# Patient Record
Sex: FEMALE | Race: WHITE | Employment: OTHER | URBAN - NONMETROPOLITAN AREA
[De-identification: names, ages, dates, MRNs, and addresses within clinical notes are randomized per-mention and may not be internally consistent; named-entity substitution may affect disease eponyms.]

---

## 2021-12-21 DIAGNOSIS — U07.1 COVID-19: ICD-10-CM

## 2021-12-21 RX ORDER — SODIUM CHLORIDE 0.9 % (FLUSH) 0.9 %
5-40 SYRINGE (ML) INJECTION PRN
OUTPATIENT
Start: 2021-12-21

## 2021-12-21 RX ORDER — ACETAMINOPHEN 325 MG/1
650 TABLET ORAL
OUTPATIENT
Start: 2021-12-21

## 2021-12-21 RX ORDER — ALBUTEROL SULFATE 90 UG/1
4 AEROSOL, METERED RESPIRATORY (INHALATION) PRN
OUTPATIENT
Start: 2021-12-21

## 2021-12-21 RX ORDER — HEPARIN SODIUM (PORCINE) LOCK FLUSH IV SOLN 100 UNIT/ML 100 UNIT/ML
500 SOLUTION INTRAVENOUS PRN
OUTPATIENT
Start: 2021-12-21

## 2021-12-21 RX ORDER — EPINEPHRINE 1 MG/ML
0.3 INJECTION, SOLUTION, CONCENTRATE INTRAVENOUS PRN
OUTPATIENT
Start: 2021-12-21

## 2021-12-21 RX ORDER — ONDANSETRON 2 MG/ML
8 INJECTION INTRAMUSCULAR; INTRAVENOUS
OUTPATIENT
Start: 2021-12-21

## 2021-12-21 RX ORDER — SODIUM CHLORIDE 9 MG/ML
INJECTION, SOLUTION INTRAVENOUS CONTINUOUS
OUTPATIENT
Start: 2021-12-21

## 2021-12-21 RX ORDER — DIPHENHYDRAMINE HYDROCHLORIDE 50 MG/ML
50 INJECTION INTRAMUSCULAR; INTRAVENOUS
OUTPATIENT
Start: 2021-12-21

## 2021-12-21 RX ORDER — SODIUM CHLORIDE 9 MG/ML
25 INJECTION, SOLUTION INTRAVENOUS PRN
OUTPATIENT
Start: 2021-12-21

## 2021-12-21 NOTE — PROGRESS NOTES
REGEN-COV ordered by a non-privileged provider  - Whitney Aguilar     1. Vaccine status -  Yes  2. Date of Positive SARS-CoV-2 test - 12/20/21  3. Symptom onset - 12/19/21  4. Criteria met for REGEN-COV - age  11. O2 sat on room air - not available, not on oxygen  6. EUA and side effects have been reviewed either verbally or given to patient Yes  7.  Provider phone # if questions arise - [phone]    Dr Win Ulrich agreeable to co-signing     Sathya Terry, ReaganD, BCPS 12/21/2021 12:22 PM

## 2021-12-22 ENCOUNTER — HOSPITAL ENCOUNTER (OUTPATIENT)
Dept: GENERAL RADIOLOGY | Age: 66
Discharge: HOME | End: 2021-12-22
Payer: MEDICARE

## 2021-12-22 VITALS
DIASTOLIC BLOOD PRESSURE: 74 MMHG | SYSTOLIC BLOOD PRESSURE: 134 MMHG | RESPIRATION RATE: 18 BRPM | TEMPERATURE: 97.8 F | OXYGEN SATURATION: 96 % | HEART RATE: 92 BPM

## 2021-12-22 DIAGNOSIS — U07.1 COVID-19: Primary | ICD-10-CM

## 2021-12-22 PROCEDURE — 96365 THER/PROPH/DIAG IV INF INIT: CPT

## 2021-12-22 PROCEDURE — 6360000002 HC RX W HCPCS: Performed by: INTERNAL MEDICINE

## 2021-12-22 PROCEDURE — M0243 CASIRIVI AND IMDEVI INFUSION: HCPCS

## 2021-12-22 PROCEDURE — 2580000003 HC RX 258: Performed by: INTERNAL MEDICINE

## 2021-12-22 RX ORDER — SODIUM CHLORIDE 9 MG/ML
INJECTION, SOLUTION INTRAVENOUS CONTINUOUS
OUTPATIENT
Start: 2021-12-22

## 2021-12-22 RX ORDER — SODIUM CHLORIDE 0.9 % (FLUSH) 0.9 %
5-40 SYRINGE (ML) INJECTION PRN
OUTPATIENT
Start: 2021-12-22

## 2021-12-22 RX ORDER — ALBUTEROL SULFATE 90 UG/1
4 AEROSOL, METERED RESPIRATORY (INHALATION) PRN
OUTPATIENT
Start: 2021-12-22

## 2021-12-22 RX ORDER — ACETAMINOPHEN 325 MG/1
650 TABLET ORAL
OUTPATIENT
Start: 2021-12-22

## 2021-12-22 RX ORDER — DIPHENHYDRAMINE HYDROCHLORIDE 50 MG/ML
50 INJECTION INTRAMUSCULAR; INTRAVENOUS
OUTPATIENT
Start: 2021-12-22

## 2021-12-22 RX ORDER — ONDANSETRON 2 MG/ML
8 INJECTION INTRAMUSCULAR; INTRAVENOUS
OUTPATIENT
Start: 2021-12-22

## 2021-12-22 RX ORDER — HEPARIN SODIUM (PORCINE) LOCK FLUSH IV SOLN 100 UNIT/ML 100 UNIT/ML
500 SOLUTION INTRAVENOUS PRN
OUTPATIENT
Start: 2021-12-22

## 2021-12-22 RX ORDER — SODIUM CHLORIDE 9 MG/ML
25 INJECTION, SOLUTION INTRAVENOUS PRN
OUTPATIENT
Start: 2021-12-22

## 2021-12-22 RX ADMIN — CASIRIVIMAB AND IMDEVIMAB: 600; 600 INJECTION, SOLUTION, CONCENTRATE INTRAVENOUS at 08:40

## 2021-12-22 NOTE — PROGRESS NOTES
Met: yes   Safety:         (Environmental)   [place] to environment  [facility] ID band is correct and in place/ allergy band as needed   Assess for fall risk   Initiate fall precautions as applicable (fall band, side rails, etc.)   Call light within reach   Bed in low position/ wheels locked    Met: yes   Pain:        Assess pain level and characteristics   Administer analgesics as ordered   Assess effectiveness of pain management and report to MD as needed    Met: yes   Knowledge Deficit:   Assess baseline knowledge   Provide teaching at level of understanding   Provide teaching via preferred learning method   Evaluate teaching effectiveness    Met: yes   Hemodynamic/Respiratory Status:       (Pre and Post Procedure Monitoring)   Assess/Monitor vital signs and LOC   Assess Baseline SpO2 prior to any sedation   Obtain weight/height   Assess vital signs/ LOC until patient meets discharge criteria   Monitor procedure site and notify MD of any issues    Met: yes   Infection-Risk of Central Venous Catheter:   Monitor for infection signs and symptoms (catheter site redness, temperature elevation, etc)   Assess for infection risks   Educate regarding infection prevention   Manage central venous catheter (flushes/ dressing changes per protocol)

## 2021-12-22 NOTE — PROGRESS NOTES
Pt arrives for Regeneron infusion. Gait steady respirations easy and unlabored. Skin warm and dry. Pt reviewed EUA on Regeneron and is agreeable with infusion.

## 2021-12-22 NOTE — PROGRESS NOTES
Observation period complete. Respirations remain easy and unlabored. Skin warm and dry. Pt denies any complaints and left ambulatory per self. Pt in stable condition.

## 2022-06-24 ENCOUNTER — TELEPHONE (OUTPATIENT)
Dept: FAMILY MEDICINE CLINIC | Age: 67
End: 2022-06-24

## 2022-06-24 DIAGNOSIS — Z79.4 TYPE 2 DIABETES MELLITUS WITHOUT COMPLICATION, WITH LONG-TERM CURRENT USE OF INSULIN (HCC): Primary | ICD-10-CM

## 2022-06-24 DIAGNOSIS — E11.9 TYPE 2 DIABETES MELLITUS WITHOUT COMPLICATION, WITH LONG-TERM CURRENT USE OF INSULIN (HCC): Primary | ICD-10-CM

## 2022-06-24 NOTE — TELEPHONE ENCOUNTER
Called patient to reschedule her July 14, 2022 appointment per Dr. Sebastián Wang, patient rescheduled for July 21, 2022 but stated she usually has her blood work done prior to her appointments and does not have any orders!

## 2022-06-30 DIAGNOSIS — I10 PRIMARY HYPERTENSION: Primary | ICD-10-CM

## 2022-06-30 RX ORDER — AMLODIPINE BESYLATE 2.5 MG/1
2.5 TABLET ORAL DAILY
Qty: 90 TABLET | Refills: 3 | Status: SHIPPED | OUTPATIENT
Start: 2022-06-30 | End: 2022-10-27

## 2022-07-26 ENCOUNTER — NURSE ONLY (OUTPATIENT)
Dept: LAB | Age: 67
End: 2022-07-26

## 2022-07-26 DIAGNOSIS — E11.9 TYPE 2 DIABETES MELLITUS WITHOUT COMPLICATION, WITH LONG-TERM CURRENT USE OF INSULIN (HCC): ICD-10-CM

## 2022-07-26 DIAGNOSIS — Z79.4 TYPE 2 DIABETES MELLITUS WITHOUT COMPLICATION, WITH LONG-TERM CURRENT USE OF INSULIN (HCC): ICD-10-CM

## 2022-07-27 PROBLEM — E66.9 OBESITY: Status: ACTIVE | Noted: 2022-07-27

## 2022-07-27 PROBLEM — G47.33 OBSTRUCTIVE SLEEP APNEA SYNDROME: Status: ACTIVE | Noted: 2022-07-27

## 2022-07-27 PROBLEM — E78.5 HYPERLIPIDEMIA: Status: ACTIVE | Noted: 2022-07-27

## 2022-07-27 PROBLEM — M48.061 SPINAL STENOSIS OF LUMBAR REGION: Status: ACTIVE | Noted: 2022-07-27

## 2022-07-27 PROBLEM — F32.A DEPRESSIVE DISORDER: Status: ACTIVE | Noted: 2022-07-27

## 2022-07-27 PROBLEM — E11.9 TYPE 2 DIABETES MELLITUS (HCC): Status: ACTIVE | Noted: 2022-07-27

## 2022-07-27 PROBLEM — M54.50 LOW BACK PAIN: Status: ACTIVE | Noted: 2022-07-27

## 2022-07-27 PROBLEM — I87.2 CHRONIC VENOUS INSUFFICIENCY: Status: ACTIVE | Noted: 2022-07-27

## 2022-07-27 PROBLEM — I48.91 ATRIAL FIBRILLATION (HCC): Status: ACTIVE | Noted: 2022-07-27

## 2022-07-27 LAB
ALBUMIN SERPL-MCNC: 4.4 G/DL (ref 3.5–5.1)
ALP BLD-CCNC: 84 U/L (ref 38–126)
ALT SERPL-CCNC: 14 U/L (ref 11–66)
ANION GAP SERPL CALCULATED.3IONS-SCNC: 16 MEQ/L (ref 8–16)
AST SERPL-CCNC: 21 U/L (ref 5–40)
AVERAGE GLUCOSE: 189 MG/DL (ref 70–126)
BASOPHILS # BLD: 0.7 %
BASOPHILS ABSOLUTE: 0.1 THOU/MM3 (ref 0–0.1)
BILIRUB SERPL-MCNC: 0.4 MG/DL (ref 0.3–1.2)
BUN BLDV-MCNC: 36 MG/DL (ref 7–22)
CALCIUM SERPL-MCNC: 10.8 MG/DL (ref 8.5–10.5)
CHLORIDE BLD-SCNC: 98 MEQ/L (ref 98–111)
CHOLESTEROL, FASTING: 196 MG/DL (ref 100–199)
CO2: 23 MEQ/L (ref 23–33)
CREAT SERPL-MCNC: 1.2 MG/DL (ref 0.4–1.2)
CREATININE, URINE: 67.1 MG/DL
EOSINOPHIL # BLD: 4.1 %
EOSINOPHILS ABSOLUTE: 0.5 THOU/MM3 (ref 0–0.4)
ERYTHROCYTE [DISTWIDTH] IN BLOOD BY AUTOMATED COUNT: 14.9 % (ref 11.5–14.5)
ERYTHROCYTE [DISTWIDTH] IN BLOOD BY AUTOMATED COUNT: 52.4 FL (ref 35–45)
GFR SERPL CREATININE-BSD FRML MDRD: 45 ML/MIN/1.73M2
GLUCOSE FASTING: 166 MG/DL (ref 70–108)
HBA1C MFR BLD: 8.3 % (ref 4.4–6.4)
HCT VFR BLD CALC: 43.5 % (ref 37–47)
HDLC SERPL-MCNC: 47 MG/DL
HEMOGLOBIN: 13 GM/DL (ref 12–16)
IMMATURE GRANS (ABS): 0.19 THOU/MM3 (ref 0–0.07)
IMMATURE GRANULOCYTES: 1.7 %
LDL CHOLESTEROL CALCULATED: 99 MG/DL
LYMPHOCYTES # BLD: 23.9 %
LYMPHOCYTES ABSOLUTE: 2.6 THOU/MM3 (ref 1–4.8)
MCH RBC QN AUTO: 28.5 PG (ref 26–33)
MCHC RBC AUTO-ENTMCNC: 29.9 GM/DL (ref 32.2–35.5)
MCV RBC AUTO: 95.4 FL (ref 81–99)
MICROALBUMIN UR-MCNC: < 1.2 MG/DL
MICROALBUMIN/CREAT UR-RTO: 18 MG/G (ref 0–30)
MONOCYTES # BLD: 5.6 %
MONOCYTES ABSOLUTE: 0.6 THOU/MM3 (ref 0.4–1.3)
NUCLEATED RED BLOOD CELLS: 0 /100 WBC
PLATELET # BLD: 338 THOU/MM3 (ref 130–400)
PMV BLD AUTO: 10.1 FL (ref 9.4–12.4)
POTASSIUM SERPL-SCNC: 4.8 MEQ/L (ref 3.5–5.2)
RBC # BLD: 4.56 MILL/MM3 (ref 4.2–5.4)
SEG NEUTROPHILS: 64 %
SEGMENTED NEUTROPHILS ABSOLUTE COUNT: 7 THOU/MM3 (ref 1.8–7.7)
SODIUM BLD-SCNC: 137 MEQ/L (ref 135–145)
TOTAL PROTEIN: 7.1 G/DL (ref 6.1–8)
TRIGLYCERIDE, FASTING: 248 MG/DL (ref 0–199)
TSH SERPL DL<=0.05 MIU/L-ACNC: 1.57 UIU/ML (ref 0.4–4.2)
VITAMIN B-12: 661 PG/ML (ref 211–911)
WBC # BLD: 11 THOU/MM3 (ref 4.8–10.8)

## 2022-07-27 RX ORDER — INSULIN LISPRO 100 [IU]/ML
INJECTION, SOLUTION INTRAVENOUS; SUBCUTANEOUS
COMMUNITY
Start: 2022-05-02 | End: 2022-08-25

## 2022-07-27 RX ORDER — PEN NEEDLE, DIABETIC 32 GX 1/4"
NEEDLE, DISPOSABLE MISCELLANEOUS
COMMUNITY
Start: 2022-07-11

## 2022-07-27 RX ORDER — PERPHENAZINE 16 MG/1
TABLET, FILM COATED ORAL
COMMUNITY
Start: 2022-05-02 | End: 2022-08-25

## 2022-07-27 RX ORDER — INSULIN DETEMIR 100 [IU]/ML
INJECTION, SOLUTION SUBCUTANEOUS
COMMUNITY
Start: 2022-07-11

## 2022-07-27 RX ORDER — LIRAGLUTIDE 6 MG/ML
INJECTION SUBCUTANEOUS
COMMUNITY
Start: 2022-07-11

## 2022-07-28 ENCOUNTER — OFFICE VISIT (OUTPATIENT)
Dept: FAMILY MEDICINE CLINIC | Age: 67
End: 2022-07-28
Payer: MEDICARE

## 2022-07-28 VITALS
DIASTOLIC BLOOD PRESSURE: 76 MMHG | WEIGHT: 293 LBS | SYSTOLIC BLOOD PRESSURE: 122 MMHG | HEART RATE: 74 BPM | BODY MASS INDEX: 50.02 KG/M2 | OXYGEN SATURATION: 96 % | HEIGHT: 64 IN

## 2022-07-28 DIAGNOSIS — F32.A DEPRESSIVE DISORDER: ICD-10-CM

## 2022-07-28 DIAGNOSIS — N18.31 TYPE 2 DIABETES MELLITUS WITH STAGE 3A CHRONIC KIDNEY DISEASE, WITH LONG-TERM CURRENT USE OF INSULIN (HCC): Primary | ICD-10-CM

## 2022-07-28 DIAGNOSIS — E78.2 MIXED HYPERLIPIDEMIA: ICD-10-CM

## 2022-07-28 DIAGNOSIS — I48.0 PAROXYSMAL ATRIAL FIBRILLATION (HCC): ICD-10-CM

## 2022-07-28 DIAGNOSIS — J34.89 NASAL LESION: ICD-10-CM

## 2022-07-28 DIAGNOSIS — E11.22 TYPE 2 DIABETES MELLITUS WITH STAGE 3A CHRONIC KIDNEY DISEASE, WITH LONG-TERM CURRENT USE OF INSULIN (HCC): Primary | ICD-10-CM

## 2022-07-28 DIAGNOSIS — Z79.4 TYPE 2 DIABETES MELLITUS WITH STAGE 3A CHRONIC KIDNEY DISEASE, WITH LONG-TERM CURRENT USE OF INSULIN (HCC): Primary | ICD-10-CM

## 2022-07-28 PROCEDURE — G8400 PT W/DXA NO RESULTS DOC: HCPCS | Performed by: FAMILY MEDICINE

## 2022-07-28 PROCEDURE — 2022F DILAT RTA XM EVC RTNOPTHY: CPT | Performed by: FAMILY MEDICINE

## 2022-07-28 PROCEDURE — G8427 DOCREV CUR MEDS BY ELIG CLIN: HCPCS | Performed by: FAMILY MEDICINE

## 2022-07-28 PROCEDURE — G8417 CALC BMI ABV UP PARAM F/U: HCPCS | Performed by: FAMILY MEDICINE

## 2022-07-28 PROCEDURE — 3052F HG A1C>EQUAL 8.0%<EQUAL 9.0%: CPT | Performed by: FAMILY MEDICINE

## 2022-07-28 PROCEDURE — 1090F PRES/ABSN URINE INCON ASSESS: CPT | Performed by: FAMILY MEDICINE

## 2022-07-28 PROCEDURE — 1123F ACP DISCUSS/DSCN MKR DOCD: CPT | Performed by: FAMILY MEDICINE

## 2022-07-28 PROCEDURE — 1036F TOBACCO NON-USER: CPT | Performed by: FAMILY MEDICINE

## 2022-07-28 PROCEDURE — 99214 OFFICE O/P EST MOD 30 MIN: CPT | Performed by: FAMILY MEDICINE

## 2022-07-28 PROCEDURE — 3017F COLORECTAL CA SCREEN DOC REV: CPT | Performed by: FAMILY MEDICINE

## 2022-07-28 RX ORDER — ESCITALOPRAM OXALATE 20 MG/1
30 TABLET ORAL DAILY
Qty: 90 TABLET | Refills: 3 | Status: SHIPPED | OUTPATIENT
Start: 2022-07-28

## 2022-07-28 SDOH — ECONOMIC STABILITY: FOOD INSECURITY: WITHIN THE PAST 12 MONTHS, YOU WORRIED THAT YOUR FOOD WOULD RUN OUT BEFORE YOU GOT MONEY TO BUY MORE.: NEVER TRUE

## 2022-07-28 SDOH — ECONOMIC STABILITY: FOOD INSECURITY: WITHIN THE PAST 12 MONTHS, THE FOOD YOU BOUGHT JUST DIDN'T LAST AND YOU DIDN'T HAVE MONEY TO GET MORE.: NEVER TRUE

## 2022-07-28 ASSESSMENT — PATIENT HEALTH QUESTIONNAIRE - PHQ9
2. FEELING DOWN, DEPRESSED OR HOPELESS: 0
SUM OF ALL RESPONSES TO PHQ QUESTIONS 1-9: 0
6. FEELING BAD ABOUT YOURSELF - OR THAT YOU ARE A FAILURE OR HAVE LET YOURSELF OR YOUR FAMILY DOWN: 0
8. MOVING OR SPEAKING SO SLOWLY THAT OTHER PEOPLE COULD HAVE NOTICED. OR THE OPPOSITE, BEING SO FIGETY OR RESTLESS THAT YOU HAVE BEEN MOVING AROUND A LOT MORE THAN USUAL: 0
5. POOR APPETITE OR OVEREATING: 0
4. FEELING TIRED OR HAVING LITTLE ENERGY: 0
3. TROUBLE FALLING OR STAYING ASLEEP: 0
SUM OF ALL RESPONSES TO PHQ QUESTIONS 1-9: 0
9. THOUGHTS THAT YOU WOULD BE BETTER OFF DEAD, OR OF HURTING YOURSELF: 0
7. TROUBLE CONCENTRATING ON THINGS, SUCH AS READING THE NEWSPAPER OR WATCHING TELEVISION: 0
10. IF YOU CHECKED OFF ANY PROBLEMS, HOW DIFFICULT HAVE THESE PROBLEMS MADE IT FOR YOU TO DO YOUR WORK, TAKE CARE OF THINGS AT HOME, OR GET ALONG WITH OTHER PEOPLE: 0

## 2022-07-28 ASSESSMENT — SOCIAL DETERMINANTS OF HEALTH (SDOH): HOW HARD IS IT FOR YOU TO PAY FOR THE VERY BASICS LIKE FOOD, HOUSING, MEDICAL CARE, AND HEATING?: NOT HARD AT ALL

## 2022-07-28 ASSESSMENT — ENCOUNTER SYMPTOMS
ABDOMINAL PAIN: 0
DIARRHEA: 0
SHORTNESS OF BREATH: 0
COUGH: 0
VOMITING: 0
SORE THROAT: 0
CONSTIPATION: 0
TROUBLE SWALLOWING: 0

## 2022-07-28 NOTE — PROGRESS NOTES
[phone]  Dept: [phone]     SINTIA Christian is a 69 y. o.female    Pt presents for follow up of medications. Pt feeling ok since last visit- interval history and any new issues noted below:       Patient Active Problem List   Diagnosis    COVID-19    Atrial fibrillation (HCC)    Chronic venous insufficiency    Depressive disorder    Type 2 diabetes mellitus (HCC)    Hyperlipidemia    Obesity    Obstructive sleep apnea syndrome    Spinal stenosis of lumbar region       Current Outpatient Medications   Medication Sig Dispense Refill    escitalopram (LEXAPRO) 20 MG tablet Take 1.5 tablets by mouth in the morning. 90 tablet 3    metFORMIN (GLUCOPHAGE) 500 MG tablet Take 1 tablet by mouth in the morning and 1 tablet at noon and 1 tablet before bedtime. 180 tablet 3    CONTOUR NEXT TEST strip       LEVEMIR FLEXTOUCH 100 UNIT/ML injection pen       HUMALOG KWIKPEN 100 UNIT/ML SOPN       NOVOFINE PEN NEEDLE 32G X 6 MM MISC       VICTOZA 18 MG/3ML SOPN SC injection       amLODIPine (NORVASC) 2.5 MG tablet Take 1 tablet by mouth daily 90 tablet 3    fluticasone (FLONASE) 50 MCG/ACT nasal spray 1 spray by Nasal route daily. lisinopril-hydrochlorothiazide (PRINZIDE;ZESTORETIC) 10-12.5 MG per tablet Take 1 tablet by mouth 2 times daily. metoprolol (LOPRESSOR) 100 MG tablet Take 100 mg by mouth 2 times daily. levothyroxine (SYNTHROID) 100 MCG tablet Take 100 mcg by mouth Daily. aspirin 81 MG chewable tablet Take 81 mg by mouth daily. calcium carbonate 600 MG TABS tablet Take 1 tablet by mouth daily as needed. celecoxib (CELEBREX) 200 MG capsule Take 200 mg by mouth 2 times daily. apixaban (ELIQUIS) 2.5 MG TABS tablet Take  by mouth 2 times daily. meclizine (ANTIVERT) 25 MG tablet Take 25 mg by mouth 3 times daily as needed.  (Patient not taking: Reported on 7/28/2022)       No current facility-administered medications for this visit. Review of Systems   Constitutional:  Negative for appetite change, fatigue, fever and unexpected weight change. HENT:  Negative for congestion, ear pain, sore throat and trouble swallowing. Eyes:  Negative for visual disturbance. Respiratory:  Negative for cough and shortness of breath. Cardiovascular:  Negative for chest pain, palpitations and leg swelling. Gastrointestinal:  Negative for abdominal pain, constipation, diarrhea and vomiting. Genitourinary:  Negative for dysuria and frequency. Musculoskeletal:  Negative for arthralgias and myalgias. Skin:  Negative for rash. Neurological:  Negative for dizziness. OBJECTIVE     /76 (Site: Left Upper Arm, Position: Sitting, Cuff Size: Large Adult)   Pulse 74   Ht 5' 4\" (1.626 m)   Wt (!) 329 lb 6.4 oz (149.4 kg)   SpO2 96%   BMI 56.54 kg/m²   Body mass index is 56.54 kg/m². BP Readings from Last 3 Encounters:   07/28/22 122/76   12/22/21 134/74         Physical Exam  Vitals and nursing note reviewed. Constitutional:       General: She is not in acute distress. Appearance: Normal appearance. She is normal weight. She is not ill-appearing. HENT:      Head: Normocephalic and atraumatic. Right Ear: Tympanic membrane, ear canal and external ear normal.      Left Ear: Tympanic membrane, ear canal and external ear normal.      Nose: Nose normal.      Mouth/Throat:      Mouth: Mucous membranes are moist.      Pharynx: Oropharynx is clear. Eyes:      Extraocular Movements: Extraocular movements intact. Conjunctiva/sclera: Conjunctivae normal.      Pupils: Pupils are equal, round, and reactive to light. Cardiovascular:      Rate and Rhythm: Normal rate and regular rhythm. Pulses: Normal pulses. Heart sounds: No murmur heard. Pulmonary:      Effort: Pulmonary effort is normal. No respiratory distress. Breath sounds: Normal breath sounds. No wheezing. Abdominal:      General: Abdomen is flat. Bowel sounds are normal. There is no distension. Palpations: Abdomen is soft. There is no mass. Tenderness: There is no abdominal tenderness. There is no guarding or rebound. Musculoskeletal:         General: No swelling. Normal range of motion. Cervical back: Normal range of motion. No tenderness. Right lower leg: No edema. Left lower leg: No edema. Lymphadenopathy:      Cervical: No cervical adenopathy. Skin:     General: Skin is warm. Findings: Lesion (Patient with a several millimeter raised lesion with rolled borders on the left side of her nose) present. No rash. Neurological:      General: No focal deficit present. Mental Status: She is alert. Comments: Diabetic foot exam shows patient to have slightly diminished pulses in both feet. Monofilament test positive for peripheral neuropathy and patient with callus formation on both feet. Patient also does have some irregularity to the toes from osteoarthritis. No open lesions at this time   Psychiatric:         Mood and Affect: Mood normal.       No results found for this visit on 07/28/22.     Lab Results   Component Value Date    LABA1C 8.3 (H) 07/26/2022       Lab Results   Component Value Date    HDL 47 07/26/2022    LDLCALC 99 07/26/2022       The 10-year ASCVD risk score (Norma Drake, et al., 2013) is: 15%    Values used to calculate the score:      Age: 69 years      Sex: Female      Is Non- : No      Diabetic: Yes      Tobacco smoker: No      Systolic Blood Pressure: 896 mmHg      Is BP treated: No      HDL Cholesterol: 47 mg/dL      Total Cholesterol: 196 mg/dl    Lab Results   Component Value Date     07/26/2022    K 4.8 07/26/2022    CL 98 07/26/2022    CO2 23 07/26/2022    BUN 36 (H) 07/26/2022    CREATININE 1.2 07/26/2022    GLUCOSE 117 (H) 11/28/2014    CALCIUM 10.8 (H) 07/26/2022    PROT 7.1 07/26/2022    LABALBU 4.4 07/26/2022 BILITOT 0.4 07/26/2022    ALKPHOS 84 07/26/2022    AST 21 07/26/2022    ALT 14 07/26/2022    LABGLOM 45 (A) 07/26/2022     estimated creatinine clearance is 65 mL/min (based on SCr of 1.2 mg/dL). Lab Results   Component Value Date    LABMICR < 1.20 07/27/2022       Lab Results   Component Value Date    TSH 1.570 07/26/2022       Lab Results   Component Value Date    WBC 11.0 (H) 07/26/2022    HGB 13.0 07/26/2022    HCT 43.5 07/26/2022    MCV 95.4 07/26/2022     07/26/2022       No results found for: PSA, PSADIA    Immunization History   Administered Date(s) Administered    Influenza A (R0W1-81) Vaccine PF IM 11/30/2009    Influenza Virus Vaccine 09/26/2016, 09/26/2016    Influenza, Intradermal, Preservative free 01/21/2013    Influenza, Quadv, IM, PF (6 mo and older Fluzone, Flulaval, Fluarix, and 3 yrs and older Afluria) 10/17/2018, 11/07/2019, 11/09/2020    Pneumococcal Conjugate 13-valent Tommas Lion) 10/17/2018       Health Maintenance   Topic Date Due    Annual Wellness Visit (AWV)  Never done    COVID-19 Vaccine (1) Never done    Diabetic foot exam  Never done    Diabetic retinal exam  Never done    Hepatitis C screen  Never done    DTaP/Tdap/Td vaccine (1 - Tdap) Never done    Colorectal Cancer Screen  Never done    Breast cancer screen  Never done    Shingles vaccine (1 of 2) Never done    DEXA (modify frequency per FRAX score)  Never done    Pneumococcal 65+ years Vaccine (2 - PPSV23 or PCV20) 10/17/2019    Flu vaccine (1) 09/01/2022    A1C test (Diabetic or Prediabetic)  07/26/2023    Lipids  07/26/2023    Diabetic microalbuminuria test  07/27/2023    Depression Monitoring  07/28/2023    Hepatitis A vaccine  Aged Out    Hib vaccine  Aged Out    Meningococcal (ACWY) vaccine  Aged Out       Future Appointments   Date Time Provider Avril Beth   10/27/2022  2:00 PM Kusum Montoya MD [address] [facility]         ASSESSMENT       Diagnosis Orders   1.  Type 2 diabetes mellitus with stage 3a chronic kidney disease, with long-term current use of insulin ([address]  [address] [facility]    Comprehensive Metabolic Panel, Fasting    Hemoglobin A1C      2. Body mass index (BMI) 50.0-59.9, adult (HCC)        3. Paroxysmal atrial fibrillation ([address]        4. Nasal lesion  External Referral To Dermatology      5. Depressive disorder        6. Mixed hyperlipidemia            PLAN      1. Type 2 diabetes mellitus with stage 3a chronic kidney disease, with long-term current use of insulin (HCC)  A1c poorly controlled and on very high doses of Lantus refer to diabetes center for modification of insulin dosing and to consider insulin pump continue current insulin dosing in the meantime  - [address] [facility]  - Comprehensive Metabolic Panel, Fasting; Future  - Hemoglobin A1C; Future    2. Body mass index (BMI) 50.0-59.9, adult (HCC)  BMI reviewed diet/exercise reviewed    3. Paroxysmal atrial fibrillation (HCC)  In normal sinus rhythm today continue on anticoagulation no side effects on Eliquis no issues with affording it    4. Nasal lesion  Very concerning for basal cell carcinoma refer to dermatology  - External Referral To Dermatology    5. Depressive disorder  Patient doing great on Lexapro is on a higher dose then approved off label use was reviewed patient is doing great on it no side effects continue    6.  Mixed hyperlipidemia  Patient's been recommended to be on a statin by myself and cardiology patient declines at this time concerned about side effects risks of not being on it once again reviewed continue off meds for now    Follow-up 3 months with labs            Deshawn Feliciano MD  2:52 PM  7/28/22

## 2022-08-09 ENCOUNTER — TELEPHONE (OUTPATIENT)
Dept: FAMILY MEDICINE CLINIC | Age: 67
End: 2022-08-09

## 2022-08-09 NOTE — TELEPHONE ENCOUNTER
Clint Goodpasture from [facility] is requesting office notes from the patients 7/28/2022 office visit.  Please fax to [phone]

## 2022-08-09 NOTE — TELEPHONE ENCOUNTER
Susan faxed the office note to [facility] at [phone] and received a fax confirmation back.

## 2022-08-16 ENCOUNTER — HOSPITAL ENCOUNTER (OUTPATIENT)
Age: 67
Setting detail: SPECIMEN
Discharge: HOME | End: 2022-08-16
Payer: MEDICARE

## 2022-08-16 DIAGNOSIS — J02.9 ACUTE PHARYNGITIS, UNSPECIFIED ETIOLOGY: ICD-10-CM

## 2022-08-16 DIAGNOSIS — J02.9 ACUTE PHARYNGITIS, UNSPECIFIED ETIOLOGY: Primary | ICD-10-CM

## 2022-08-16 DIAGNOSIS — J02.0 ACUTE STREPTOCOCCAL PHARYNGITIS: Primary | ICD-10-CM

## 2022-08-16 LAB
GROUP A STREP CULTURE, REFLEX: POSITIVE
REFLEX THROAT C + S: NORMAL

## 2022-08-16 PROCEDURE — 87636 SARSCOV2 & INF A&B AMP PRB: CPT

## 2022-08-16 PROCEDURE — 87880 STREP A ASSAY W/OPTIC: CPT

## 2022-08-16 RX ORDER — AMOXICILLIN 500 MG/1
1000 CAPSULE ORAL 2 TIMES DAILY
Qty: 40 CAPSULE | Refills: 0 | Status: SHIPPED | OUTPATIENT
Start: 2022-08-16 | End: 2022-08-26

## 2022-08-16 NOTE — PROGRESS NOTES
Patient called with fevers chills sore throat runny nose and cough for 24 hours not short of breath home COVID test negative.   Ordered COVID test and strep screen with reflex to be done at [facility] ambulatory care [place]

## 2022-08-17 LAB
INFLUENZA A: NOT DETECTED
INFLUENZA B: NOT DETECTED
SARS-COV-2 RNA, RT PCR: NOT DETECTED

## 2022-08-18 ENCOUNTER — TELEPHONE (OUTPATIENT)
Dept: FAMILY MEDICINE CLINIC | Age: 67
End: 2022-08-18

## 2022-08-18 RX ORDER — FLUCONAZOLE 150 MG/1
150 TABLET ORAL ONCE
Qty: 1 TABLET | Refills: 2 | Status: SHIPPED | OUTPATIENT
Start: 2022-08-18 | End: 2022-08-18

## 2022-08-19 NOTE — TELEPHONE ENCOUNTER
Patient called with vaginal candidiasis symptoms after starting antibiotics.   Diflucan sent to pharmacy

## 2022-08-25 RX ORDER — PERPHENAZINE 16 MG/1
TABLET, FILM COATED ORAL
Qty: 100 STRIP | Refills: 0 | Status: SHIPPED | OUTPATIENT
Start: 2022-08-25

## 2022-08-25 RX ORDER — INSULIN LISPRO 100 [IU]/ML
INJECTION, SOLUTION INTRAVENOUS; SUBCUTANEOUS
Qty: 15 ML | Refills: 0 | Status: SHIPPED | OUTPATIENT
Start: 2022-08-25

## 2022-09-01 ENCOUNTER — APPOINTMENT (OUTPATIENT)
Dept: GENERAL RADIOLOGY | Age: 67
End: 2022-09-01
Payer: MEDICARE

## 2022-09-01 ENCOUNTER — HOSPITAL ENCOUNTER (EMERGENCY)
Age: 67
Discharge: HOME | End: 2022-09-01
Attending: EMERGENCY MEDICINE
Payer: MEDICARE

## 2022-09-01 VITALS
TEMPERATURE: 98.4 F | SYSTOLIC BLOOD PRESSURE: 159 MMHG | HEIGHT: 65 IN | WEIGHT: 293 LBS | BODY MASS INDEX: 48.82 KG/M2 | DIASTOLIC BLOOD PRESSURE: 76 MMHG | HEART RATE: 90 BPM | OXYGEN SATURATION: 96 % | RESPIRATION RATE: 18 BRPM

## 2022-09-01 DIAGNOSIS — R79.89 ELEVATED SERUM CREATININE: ICD-10-CM

## 2022-09-01 DIAGNOSIS — L03.116 CELLULITIS OF LEFT LOWER EXTREMITY: Primary | ICD-10-CM

## 2022-09-01 LAB
ANION GAP: 8 MEQ/L (ref 8–16)
BUN BLDV-MCNC: 28 MG/DL (ref 7–18)
CHLORIDE BLD-SCNC: 94 MEQ/L (ref 98–107)
CO2: 29 MEQ/L (ref 21–32)
CREAT SERPL-MCNC: 1.7 MG/DL (ref 0.6–1.3)
GFR, ESTIMATED: 32 ML/MIN/1.73M2
GLUCOSE BLD-MCNC: 195 MG/DL (ref 74–106)
HCT VFR BLD CALC: 39.5 % (ref 37–47)
HEMOGLOBIN: 12.9 GM/DL (ref 12–16)
MCH RBC QN AUTO: 28.1 PG (ref 27–31)
MCHC RBC AUTO-ENTMCNC: 32.7 GM/DL (ref 31–35)
MCV RBC AUTO: 86 FL (ref 81–99)
PDW BLD-RTO: 12.8 % (ref 11.5–14.5)
PLATELET # BLD: 234 THOU/MM3 (ref 130–400)
PMV BLD AUTO: 7.2 FL (ref 7.4–10.4)
POC CALCIUM: 10.1 MG/DL (ref 8.5–10.1)
POTASSIUM SERPL-SCNC: 4.2 MEQ/L (ref 3.5–5.1)
RBC # BLD: 4.59 MILL/MM3 (ref 4.1–5.3)
SODIUM BLD-SCNC: 131 MEQ/L (ref 136–145)
WBC # BLD: 11.4 THOU/MM3 (ref 4.8–10.8)

## 2022-09-01 PROCEDURE — 80048 BASIC METABOLIC PNL TOTAL CA: CPT

## 2022-09-01 PROCEDURE — 6370000000 HC RX 637 (ALT 250 FOR IP): Performed by: EMERGENCY MEDICINE

## 2022-09-01 PROCEDURE — 73630 X-RAY EXAM OF FOOT: CPT

## 2022-09-01 PROCEDURE — 85025 COMPLETE CBC W/AUTO DIFF WBC: CPT

## 2022-09-01 PROCEDURE — 36415 COLL VENOUS BLD VENIPUNCTURE: CPT

## 2022-09-01 PROCEDURE — 99284 EMERGENCY DEPT VISIT MOD MDM: CPT

## 2022-09-01 RX ORDER — DOXYCYCLINE 100 MG/1
100 TABLET ORAL 2 TIMES DAILY
Qty: 20 TABLET | Refills: 0 | Status: SHIPPED | OUTPATIENT
Start: 2022-09-01 | End: 2022-09-11

## 2022-09-01 RX ORDER — DOXYCYCLINE HYCLATE 100 MG
100 TABLET ORAL ONCE
Status: COMPLETED | OUTPATIENT
Start: 2022-09-01 | End: 2022-09-01

## 2022-09-01 RX ADMIN — DOXYCYCLINE HYCLATE 100 MG: 100 TABLET, COATED ORAL at 19:50

## 2022-09-01 ASSESSMENT — ENCOUNTER SYMPTOMS
NAUSEA: 0
ABDOMINAL PAIN: 0
RHINORRHEA: 0
SHORTNESS OF BREATH: 0
COUGH: 0

## 2022-09-01 NOTE — ED PROVIDER NOTES
Patient with type  [place] ENCOUNTER          Pt Name: Lyubov Rosen  MRN: [id number]  [place] 1955  Date of evaluation: 9/1/2022  Emergency Physician: Shannon [address] Dr       Chief Complaint   Patient presents with    Wound Check     Pt arrives stating left on Vacation Monday after getting COVID booster, [place] in [place] started to sweat and slept all day, yesterday felt the same noticed around toes of lt foot cracks to skin noted with redness to top of foot by toes     History obtained from the patient. HISTORY OF PRESENT ILLNESS    HPI  Lyubov Rosen is a 69 y.o. female who presents to the emergency department for evaluation of left foot wound. Medical history of diabetic neuropathy and lymphedema. She was recently traveling to visit her family. She states yesterday evening she had night sweats and chills. She initially attributed this to the COVID booster she got the day prior. She woke up today and then inspected her left foot and noticed blisters and redness at the bases of her toes on her left foot. Patient denies trauma but states it is possible as she has no sensation in her feet. Patient was recently on antibiotics 1 week ago amoxicillin for a UTI. No abdominal pain no vomiting no cough no shortness of breath no difficulty breathing. The patient has no other acute complaints at this time. REVIEW OF SYSTEMS   Review of Systems   Constitutional:  Negative for chills and fever. HENT:  Negative for congestion and rhinorrhea. Respiratory:  Negative for cough and shortness of breath. Cardiovascular:  Positive for leg swelling (chronic unchanged). Negative for chest pain and palpitations. Gastrointestinal:  Negative for abdominal pain and nausea. Genitourinary:  Negative for dysuria and frequency. Musculoskeletal:  Negative for myalgias. Skin:  Positive for wound. Negative for rash. Neurological:  Negative for headaches. PAST MEDICAL AND SURGICAL HISTORY     Past Medical History:   Diagnosis Date    CAD (coronary artery disease)      Past Surgical History:   Procedure Laterality Date    CARDIAC CATHETERIZATION           MEDICATIONS   No current facility-administered medications for this encounter. Current Outpatient Medications:     CONTOUR NEXT TEST strip, TEST BLOOD SUGAR, FOUR TIMES A DAY, AS DIRECTED., Disp: 100 strip, Rfl: 0    HUMALOG KWIKPEN 100 UNIT/ML SOPN, GIVE 15U, S.Q., BEFORE EACH MEAL, AS DIRECTED., Disp: 15 mL, Rfl: 0    escitalopram (LEXAPRO) 20 MG tablet, Take 1.5 tablets by mouth in the morning., Disp: 90 tablet, Rfl: 3    metFORMIN (GLUCOPHAGE) 500 MG tablet, Take 1 tablet by mouth in the morning and 1 tablet at noon and 1 tablet before bedtime. , Disp: 180 tablet, Rfl: 3    LEVEMIR FLEXTOUCH 100 UNIT/ML injection pen, , Disp: , Rfl:     NOVOFINE PEN NEEDLE 32G X 6 MM MISC, , Disp: , Rfl:     VICTOZA 18 MG/3ML SOPN SC injection, , Disp: , Rfl:     amLODIPine (NORVASC) 2.5 MG tablet, Take 1 tablet by mouth daily, Disp: 90 tablet, Rfl: 3    meclizine (ANTIVERT) 25 MG tablet, Take 25 mg by mouth 3 times daily as needed. (Patient not taking: Reported on 7/28/2022), Disp: , Rfl:     fluticasone (FLONASE) 50 MCG/ACT nasal spray, 1 spray by Nasal route daily. , Disp: , Rfl:     lisinopril-hydrochlorothiazide (PRINZIDE;ZESTORETIC) 10-12.5 MG per tablet, Take 1 tablet by mouth 2 times daily. , Disp: , Rfl:     metoprolol (LOPRESSOR) 100 MG tablet, Take 100 mg by mouth 2 times daily. , Disp: , Rfl:     levothyroxine (SYNTHROID) 100 MCG tablet, Take 100 mcg by mouth Daily. , Disp: , Rfl:     aspirin 81 MG chewable tablet, Take 81 mg by mouth daily. , Disp: , Rfl:     calcium carbonate 600 MG TABS tablet, Take 1 tablet by mouth daily as needed. , Disp: , Rfl:     celecoxib (CELEBREX) 200 MG capsule, Take 200 mg by mouth 2 times daily. , Disp: , Rfl:     apixaban (ELIQUIS) 2.5 MG TABS tablet, Take  by mouth 2 times daily. , Disp: , Rfl:       SOCIAL HISTORY     Social History     Social History Narrative    Not on file     Social History     Tobacco Use    Smoking status: Never    Smokeless tobacco: Never   Vaping Use    Vaping Use: Never used   Substance Use Topics    Alcohol use: Never    Drug use: Never         ALLERGIES     Allergies   Allergen Reactions    Hydrocodone     Promethazine          FAMILY HISTORY   No family history on file. PHYSICAL EXAM     ED Triage Vitals [09/01/22 1922]   BP Temp Temp Source Heart Rate Resp SpO2 Height Weight   (!) 187/81 98.4 °F (36.9 °C) Temporal 98 20 94 % 5' 5\" (1.651 m) (!) 323 lb (146.5 kg)         Additional Vital Signs:  Vitals:    09/01/22 1922   BP: (!) 187/81   Pulse: 98   Resp: 20   Temp: 98.4 °F (36.9 °C)   SpO2: 94%       Physical Exam  Vitals and nursing note reviewed. Constitutional:       General: She is not in acute distress. Appearance: She is not ill-appearing. HENT:      Head: Normocephalic and atraumatic. Nose: No congestion or rhinorrhea. Mouth/Throat:      Mouth: Mucous membranes are moist.   Cardiovascular:      Rate and Rhythm: Normal rate and regular rhythm. Pulses: Normal pulses. Heart sounds: Normal heart sounds. Pulmonary:      Effort: Pulmonary effort is normal.      Breath sounds: Normal breath sounds. Musculoskeletal:         General: No tenderness or deformity. Normal range of motion. Right lower leg: Edema present. Left lower leg: Edema present. Comments: Lower extremity edema, chronic venous stasis changes. No bony TTP   Skin:     Findings: Erythema (cracked skin dorsal interdigital webspaces. dull erythema to doral midfoot. no streaking.) present. No bruising. Comments: No crepitus     Neurological:      Mental Status: She is alert.    Psychiatric:         Attention and Perception: Attention normal.         Mood and Affect: Affect normal.       Initial vital signs and nursing assessment reviewed and abnormal from HTN . Pulsoximetry is normal per my interpretation. MEDICAL DECISION MAKING   Initial Assessment: Given the patient's above chief complaint and findings on history and physical examination, I thought it was appropriate to consider the following emergency medical conditions: Cellulitis, venous stasis changes, lymphedema, erysipelas, sepsis, necrotizing fasciitis although some of these diagnoses are unlikely they were considered in my medical decision making. Plan: CBC, BMP, left foot x-ray Symptomatic treatment with doxycycline and reassess         ED RESULTS   Laboratory results:  Labs Reviewed   CBC WITH AUTO DIFFERENTIAL - Abnormal; Notable for the following components:       Result Value    WBC 11.4 (*)     MCHC 32.7 (*)     MPV 7.2 (*)     All other components within normal limits   BASIC METABOLIC PANEL - Abnormal; Notable for the following components:    Sodium 131 (*)     Chloride 94 (*)     Glucose 195 (*)     BUN 28 (*)     Creatinine 1.7 (*)     All other components within normal limits   DIFFERENTIAL - Abnormal; Notable for the following components:    Segs Absolute 9.1 (*)     Immature Grans (Abs) 0.12 (*)     All other components within normal limits   GLOMERULAR FILTRATION RATE, ESTIMATED - Abnormal; Notable for the following components:    GFR, Estimated 32 (*)     All other components within normal limits   ANION GAP   SCAN OF BLOOD SMEAR       Radiologic studies results:  XR FOOT LEFT (MIN 3 VIEWS)   Final Result   1. Diffuse soft tissue swelling overlying the dorsal aspect of the    forefoot. No radiographic evidence of osteomyelitis. 2. Degenerative changes of the left foot most pronounced at the 1st TMT    joint. 3. Calcaneal enthesophyte and plantar calcaneal spur. This document has been electronically signed by:  Ann-Marie Carver MD on    09/01/2022 08:23 PM          ED Medications administered this visit:   Medications   doxycycline hyclate (VIBRA-TABS) tablet 100 mg (100 mg Oral Given 9/1/22 1950)         ED COURSE   Patient with previously known chronic kidney disease. Her symptoms are likely multifactorial.  Skin cracking and drainage is likely due to patient's lymphedema. Her recent travel and not using her lymphedema pump likely compounded the breakage she had an skin and drainage. Minimal redness. Redness seems to correlate more for chronic venous stasis changes. Patient to be covered with doxycycline and follow-up with her wound care specialist and podiatrist.  Patient's creatinine noted to be 1.7. Is above patient's baseline. Patient to discontinue NSAID use. Follow-up for recheck with primary care provider in 2 weeks. The diagnosis, extensive differential diagnosis, laboratory and imaging findings were discussed at the bedside. The patient was an active participant in their care. They are agreeable to the plan of care. All questions and concerns were addressed at the time of the encounter. MEDICATION CHANGES     DISCHARGE MEDICATIONS:  Discharge Medication List as of 9/1/2022  9:01 PM        START taking these medications    Details   doxycycline monohydrate (ADOXA) 100 MG tablet Take 1 tablet by mouth 2 times daily for 10 days, Disp-20 tablet, R-0Normal                  FINAL DISPOSITION     Final diagnoses:   Cellulitis of left lower extremity   Elevated serum creatinine     Condition: condition: good  Dispo: Discharge to home    PATIENT REFERRED TO:  Carlita Delvalle MD  [address] [place] [address],[address] [phone]    Schedule an appointment as soon as possible for a visit       Critical Care Time   None      This transcription was electronically signed. Parts of this transcriptions may have been dictated by use of voice recognition software and electronically transcribed, and parts may have been transcribed with the assistance of an ED scribe.  The transcription may contain errors not detected in proofreading.     Electronically Signed: Kathy Brewer DO, 09/01/22, 7:54 PM       Kathy Brewer DO  09/03/22 3551

## 2022-09-02 LAB
BASOPHILS # BLD: 0.4 %
BASOPHILS ABSOLUTE: 0.1 THOU/MM3 (ref 0–0.1)
EOSINOPHIL # BLD: 0.6 %
EOSINOPHILS ABSOLUTE: 0.1 THOU/MM3 (ref 0–0.4)
IMMATURE GRANS (ABS): 0.12 THOU/MM3 (ref 0–0.07)
IMMATURE GRANULOCYTES: 1.1 %
LYMPHOCYTES # BLD: 12.2 %
LYMPHOCYTES ABSOLUTE: 1.4 THOU/MM3 (ref 1–4.8)
MONOCYTES # BLD: 5.2 %
MONOCYTES ABSOLUTE: 0.6 THOU/MM3 (ref 0.4–1.3)
NUCLEATED RED BLOOD CELLS: 0 /100 WBC
PLATELET ESTIMATE: ADEQUATE
SCAN OF BLOOD SMEAR: NORMAL
SEG NEUTROPHILS: 80.5 %
SEGMENTED NEUTROPHILS ABSOLUTE COUNT: 9.1 THOU/MM3 (ref 1.8–7.7)

## 2022-09-02 NOTE — DISCHARGE INSTRUCTIONS
Be sure to call your family practitioner for recheck kidney function in the next 2 weeks. You should discontinue NSAID use such as Advil, Aleve, naproxen, or ibuprofen. Call tomorrow to schedule follow-up with your podiatrist and wound care specialist at [place] early next week. Return to the ED if you have new or changing symptoms such as redness, drainage, fever, chills, red streaking up your leg, or you have any other concerns.

## 2022-09-02 NOTE — ED NOTES
Pt resting in bed, skin w/d, resp easy, will continue to monitor     Laury Nettles RN  09/01/22 2027

## 2022-09-02 NOTE — ED NOTES
Dressing applied to lt foot, adaptic with curlex and loose coban, pt familiar with coban, pt and family both verbalize use of coban and to ensure dressing does not shrink tight while in use and to use only when needed     Eze García RN  09/01/22 2100

## 2022-09-22 ENCOUNTER — OFFICE VISIT (OUTPATIENT)
Dept: FAMILY MEDICINE CLINIC | Age: 67
End: 2022-09-22
Payer: MEDICARE

## 2022-09-22 VITALS
HEIGHT: 65 IN | SYSTOLIC BLOOD PRESSURE: 138 MMHG | DIASTOLIC BLOOD PRESSURE: 74 MMHG | OXYGEN SATURATION: 96 % | BODY MASS INDEX: 48.82 KG/M2 | HEART RATE: 88 BPM | WEIGHT: 293 LBS

## 2022-09-22 DIAGNOSIS — E11.22 TYPE 2 DIABETES MELLITUS WITH STAGE 3A CHRONIC KIDNEY DISEASE, WITH LONG-TERM CURRENT USE OF INSULIN (HCC): ICD-10-CM

## 2022-09-22 DIAGNOSIS — L03.116 CELLULITIS OF LEFT LEG: Primary | ICD-10-CM

## 2022-09-22 DIAGNOSIS — N18.31 TYPE 2 DIABETES MELLITUS WITH STAGE 3A CHRONIC KIDNEY DISEASE, WITH LONG-TERM CURRENT USE OF INSULIN (HCC): ICD-10-CM

## 2022-09-22 DIAGNOSIS — Z79.4 TYPE 2 DIABETES MELLITUS WITH STAGE 3A CHRONIC KIDNEY DISEASE, WITH LONG-TERM CURRENT USE OF INSULIN (HCC): ICD-10-CM

## 2022-09-22 PROCEDURE — G8427 DOCREV CUR MEDS BY ELIG CLIN: HCPCS | Performed by: FAMILY MEDICINE

## 2022-09-22 PROCEDURE — 2022F DILAT RTA XM EVC RTNOPTHY: CPT | Performed by: FAMILY MEDICINE

## 2022-09-22 PROCEDURE — 1123F ACP DISCUSS/DSCN MKR DOCD: CPT | Performed by: FAMILY MEDICINE

## 2022-09-22 PROCEDURE — 3017F COLORECTAL CA SCREEN DOC REV: CPT | Performed by: FAMILY MEDICINE

## 2022-09-22 PROCEDURE — G8400 PT W/DXA NO RESULTS DOC: HCPCS | Performed by: FAMILY MEDICINE

## 2022-09-22 PROCEDURE — 1036F TOBACCO NON-USER: CPT | Performed by: FAMILY MEDICINE

## 2022-09-22 PROCEDURE — 99213 OFFICE O/P EST LOW 20 MIN: CPT | Performed by: FAMILY MEDICINE

## 2022-09-22 PROCEDURE — 1090F PRES/ABSN URINE INCON ASSESS: CPT | Performed by: FAMILY MEDICINE

## 2022-09-22 PROCEDURE — 3052F HG A1C>EQUAL 8.0%<EQUAL 9.0%: CPT | Performed by: FAMILY MEDICINE

## 2022-09-22 PROCEDURE — G8417 CALC BMI ABV UP PARAM F/U: HCPCS | Performed by: FAMILY MEDICINE

## 2022-09-22 ASSESSMENT — ENCOUNTER SYMPTOMS: SHORTNESS OF BREATH: 1

## 2022-09-22 NOTE — PROGRESS NOTES
[address] [id number]  Dept: [phone]     SINTIA Gardner is a 69 y. o.female    Pt presents for follow up hospital.    Pt feeling ok since last visit- interval history and any new issues noted below:       Patient Active Problem List   Diagnosis    COVID-19    Atrial fibrillation (HCC)    Chronic venous insufficiency    Depressive disorder    Type 2 diabetes mellitus (HCC)    Hyperlipidemia    Obesity    Obstructive sleep apnea syndrome    Spinal stenosis of lumbar region       Current Outpatient Medications   Medication Sig Dispense Refill    CONTOUR NEXT TEST strip TEST BLOOD SUGAR, FOUR TIMES A DAY, AS DIRECTED. 100 strip 0    HUMALOG KWIKPEN 100 UNIT/ML SOPN GIVE 15U, S.Q., BEFORE EACH MEAL, AS DIRECTED. 15 mL 0    escitalopram (LEXAPRO) 20 MG tablet Take 1.5 tablets by mouth in the morning. 90 tablet 3    metFORMIN (GLUCOPHAGE) 500 MG tablet Take 1 tablet by mouth in the morning and 1 tablet at noon and 1 tablet before bedtime. 180 tablet 3    LEVEMIR FLEXTOUCH 100 UNIT/ML injection pen       NOVOFINE PEN NEEDLE 32G X 6 MM MISC       VICTOZA 18 MG/3ML SOPN SC injection       amLODIPine (NORVASC) 2.5 MG tablet Take 1 tablet by mouth daily 90 tablet 3    meclizine (ANTIVERT) 25 MG tablet Take 25 mg by mouth 3 times daily as needed. (Patient not taking: Reported on 7/28/2022)      fluticasone (FLONASE) 50 MCG/ACT nasal spray 1 spray by Nasal route daily. lisinopril-hydrochlorothiazide (PRINZIDE;ZESTORETIC) 10-12.5 MG per tablet Take 1 tablet by mouth 2 times daily. metoprolol (LOPRESSOR) 100 MG tablet Take 100 mg by mouth 2 times daily. levothyroxine (SYNTHROID) 100 MCG tablet Take 100 mcg by mouth Daily. aspirin 81 MG chewable tablet Take 81 mg by mouth daily. calcium carbonate 600 MG TABS tablet Take 1 tablet by mouth daily as needed.       celecoxib (CELEBREX) 200 MG capsule Take 200 mg by mouth 2 times daily. apixaban (ELIQUIS) 2.5 MG TABS tablet Take  by mouth 2 times daily. No current facility-administered medications for this visit. Review of Systems   Constitutional:  Positive for fatigue. Respiratory:  Positive for shortness of breath. Neurological:  Positive for weakness. OBJECTIVE     /74 (Site: Right Upper Arm, Position: Sitting, Cuff Size: Large Adult)   Pulse 88   Ht 5' 5\" (1.651 m)   Wt (!) 320 lb 6.4 oz (145.3 kg)   SpO2 96%   BMI 53.32 kg/m²   Body mass index is 53.32 kg/m². BP Readings from Last 3 Encounters:   09/22/22 138/74   09/01/22 (!) 159/76   07/28/22 122/76         Physical Exam  Vitals and nursing note reviewed. Constitutional:       General: She is not in acute distress. Appearance: Normal appearance. She is normal weight. She is not ill-appearing. HENT:      Head: Normocephalic and atraumatic. Right Ear: Tympanic membrane, ear canal and external ear normal.      Left Ear: Tympanic membrane, ear canal and external ear normal.      Nose: Nose normal.      Mouth/Throat:      Mouth: Mucous membranes are moist.      Pharynx: Oropharynx is clear. Eyes:      Extraocular Movements: Extraocular movements intact. Conjunctiva/sclera: Conjunctivae normal.      Pupils: Pupils are equal, round, and reactive to light. Cardiovascular:      Rate and Rhythm: Normal rate and regular rhythm. Pulses: Normal pulses. Heart sounds: No murmur heard. Pulmonary:      Effort: Pulmonary effort is normal. No respiratory distress. Breath sounds: Normal breath sounds. No wheezing. Abdominal:      General: Abdomen is flat. Bowel sounds are normal. There is no distension. Palpations: Abdomen is soft. There is no mass. Tenderness: There is no abdominal tenderness. There is no guarding or rebound. Musculoskeletal:         General: No swelling. Normal range of motion. Cervical back: Normal range of motion. No tenderness. Right lower leg: No edema. Left lower leg: No edema. Comments: Patient currently in wheelchair being moved by  due to the pain in her leg and deconditioning from recent hospitalization   Lymphadenopathy:      Cervical: No cervical adenopathy. Skin:     General: Skin is warm. Findings: Erythema: Patient with erythema right lower extremity pretty much resolved at this point compared to prior pictures that I saw. Neurological:      General: No focal deficit present. Mental Status: She is alert. Psychiatric:         Mood and Affect: Mood normal.       No results found for this visit on 09/22/22. Lab Results   Component Value Date    LABA1C 8.3 (H) 07/26/2022       Lab Results   Component Value Date    HDL 47 07/26/2022    LDLCALC 99 07/26/2022       The 10-year ASCVD risk score (Jaylene STONE, et al., 2019) is: 18.7%    Values used to calculate the score:      Age: 69 years      Sex: Female      Is Non- : No      Diabetic: Yes      Tobacco smoker: No      Systolic Blood Pressure: 188 mmHg      Is BP treated: No      HDL Cholesterol: 47 mg/dL      Total Cholesterol: 196 mg/dl    Lab Results   Component Value Date     (L) 09/01/2022    K 4.2 09/01/2022    CL 94 (L) 09/01/2022    CO2 29 09/01/2022    BUN 28 (H) 09/01/2022    CREATININE 1.7 (H) 09/01/2022    GLUCOSE 195 (H) 09/01/2022    CALCIUM 10.8 (H) 07/26/2022    PROT 7.1 07/26/2022    LABALBU 4.4 07/26/2022    BILITOT 0.4 07/26/2022    ALKPHOS 84 07/26/2022    AST 21 07/26/2022    ALT 14 07/26/2022    LABGLOM 45 (A) 07/26/2022     estimated creatinine clearance is 46 mL/min (A) (based on SCr of 1.7 mg/dL (H)).      Lab Results   Component Value Date    LABMICR < 1.20 07/27/2022       Lab Results   Component Value Date    TSH 1.570 07/26/2022       Lab Results   Component Value Date    WBC 11.4 (H) 09/01/2022    HGB 12.9 09/01/2022    HCT 39.5 09/01/2022    MCV 86.0 09/01/2022     09/01/2022       No results found for: PSA, PSADIA    Immunization History   Administered Date(s) Administered    COVID-19, PFIZER GRAY top, DO NOT Dilute, (age 15 y+), IM, 30 mcg/0.3 mL 08/29/2022    Influenza A ([id number]) Vaccine PF IM 11/30/2009    Influenza Virus Vaccine 01/21/2013, 09/26/2016, 09/26/2016    Influenza, AFLURIA (age 1 yrs+), FLUZONE, (age 10 mo+), MDV, 0.5mL 10/17/2018, 11/07/2019, 11/09/2020    Influenza, FLUARIX, FLULAVAL, FLUZONE (age 10 mo+) AND AFLURIA, (age 1 y+), PF, 0.5mL 10/17/2018, 11/07/2019, 11/09/2020    Influenza, Intradermal, Preservative free 01/21/2013    Pneumococcal Conjugate 13-valent (Rex Sans) 10/17/2018       Health Maintenance   Topic Date Due    Diabetic foot exam  Never done    Diabetic retinal exam  Never done    Hepatitis C screen  Never done    DTaP/Tdap/Td vaccine (1 - Tdap) Never done    Colorectal Cancer Screen  Never done    Breast cancer screen  Never done    Shingles vaccine (1 of 2) Never done    DEXA (modify frequency per FRAX score)  Never done    Pneumococcal 65+ years Vaccine (2 - PPSV23 or PCV20) 10/17/2019    Annual Wellness Visit (AWV)  Never done    Flu vaccine (1) 08/01/2022    COVID-19 Vaccine (2 - Pfizer series) 09/19/2022    A1C test (Diabetic or Prediabetic)  07/26/2023    Lipids  07/26/2023    Diabetic microalbuminuria test  07/27/2023    Depression Monitoring  07/28/2023    Hepatitis A vaccine  Aged Out    Hib vaccine  Aged Out    Meningococcal (ACWY) vaccine  Aged Out       Future Appointments   Date Time Provider Avril Oliveri   10/17/2022  4:00 PM León Justin RN [facility] [place] [address]   12/8/2022  2:30 PM Rhiannon Abdul MD [address] [facility] [place] [address]         ASSESSMENT       Diagnosis Orders   1. Cellulitis of left leg        2. Type 2 diabetes mellitus with stage 3a chronic kidney disease, with long-term current use of insulin ([facility]            PLAN      1. Cellulitis of left leg  Much improved.   Continue current antibiotics and follow-up with podiatry and infectious disease. Due to her deconditioning and the pain in that extremity still patient is homebound and will continue with home health    2. Type 2 diabetes mellitus with stage 3a chronic kidney disease, with long-term current use of insulin ([address]  Sugars reviewed with patient they are doing better she has upcoming appointment to work with the diabetes clinic on helping to get her insulin adjusted and for consideration of insulin pump    Follow-up in December as previously panel and on her diabetes        Preventive Health Topics:  Pt declines HIV and HEP C screening at this time due to lack of risk factors. Encouraged COVID VACCINE  Encouraged FLU VACCINE after October 1st annually. Encouraged TETANUS SHOT (TdaP/ ADACEL/ [address]) per personal pharmacy. Encouraged PREVNAR 20 at this time  Encouraged SHINGLES SHOT [facility]) - check with insurance re coverage and location of administration.       Primo Layne MD  11:57 AM  10/7/22

## 2022-10-11 RX ORDER — LISINOPRIL AND HYDROCHLOROTHIAZIDE 12.5; 1 MG/1; MG/1
TABLET ORAL
Qty: 180 TABLET | Refills: 0 | Status: SHIPPED | OUTPATIENT
Start: 2022-10-11

## 2022-10-21 ENCOUNTER — TELEPHONE (OUTPATIENT)
Dept: FAMILY MEDICINE CLINIC | Age: 67
End: 2022-10-21

## 2022-10-21 NOTE — TELEPHONE ENCOUNTER
Manoj from [facility] called needs face to face and plan of care signed as they are back on homehealth as of today!

## 2022-10-27 ENCOUNTER — TELEMEDICINE (OUTPATIENT)
Dept: FAMILY MEDICINE CLINIC | Age: 67
End: 2022-10-27
Payer: MEDICARE

## 2022-10-27 DIAGNOSIS — I87.2 CHRONIC VENOUS INSUFFICIENCY: ICD-10-CM

## 2022-10-27 DIAGNOSIS — Z79.4 TYPE 2 DIABETES MELLITUS WITH STAGE 3A CHRONIC KIDNEY DISEASE, WITH LONG-TERM CURRENT USE OF INSULIN (HCC): Primary | ICD-10-CM

## 2022-10-27 DIAGNOSIS — I48.0 PAROXYSMAL ATRIAL FIBRILLATION (HCC): ICD-10-CM

## 2022-10-27 DIAGNOSIS — E11.22 TYPE 2 DIABETES MELLITUS WITH STAGE 3A CHRONIC KIDNEY DISEASE, WITH LONG-TERM CURRENT USE OF INSULIN (HCC): Primary | ICD-10-CM

## 2022-10-27 DIAGNOSIS — N18.31 TYPE 2 DIABETES MELLITUS WITH STAGE 3A CHRONIC KIDNEY DISEASE, WITH LONG-TERM CURRENT USE OF INSULIN (HCC): Primary | ICD-10-CM

## 2022-10-27 PROCEDURE — G8428 CUR MEDS NOT DOCUMENT: HCPCS | Performed by: FAMILY MEDICINE

## 2022-10-27 PROCEDURE — 1123F ACP DISCUSS/DSCN MKR DOCD: CPT | Performed by: FAMILY MEDICINE

## 2022-10-27 PROCEDURE — 1090F PRES/ABSN URINE INCON ASSESS: CPT | Performed by: FAMILY MEDICINE

## 2022-10-27 PROCEDURE — 3017F COLORECTAL CA SCREEN DOC REV: CPT | Performed by: FAMILY MEDICINE

## 2022-10-27 PROCEDURE — 99214 OFFICE O/P EST MOD 30 MIN: CPT | Performed by: FAMILY MEDICINE

## 2022-10-27 PROCEDURE — 2022F DILAT RTA XM EVC RTNOPTHY: CPT | Performed by: FAMILY MEDICINE

## 2022-10-27 PROCEDURE — G8400 PT W/DXA NO RESULTS DOC: HCPCS | Performed by: FAMILY MEDICINE

## 2022-10-27 PROCEDURE — 3052F HG A1C>EQUAL 8.0%<EQUAL 9.0%: CPT | Performed by: FAMILY MEDICINE

## 2022-10-27 NOTE — PROGRESS NOTES
Jac Wesley (:  1955) is a Established patient, here for evaluation of the following:    Assessment & Plan   Below is the assessment and plan developed based on review of pertinent history, physical exam, labs, studies, and medications. 1. Type 2 diabetes mellitus with stage 3a chronic kidney disease, with long-term current use of insulin ([address]  Sugars have been much better with improvements in her diet reviewed her fasting sugars today. Continue current treatment A1c ordered for follow-up in December. Also plan to get her back into see the diabetes clinic to discuss optimizing how she doses her insulin including considering insulin pump  2. Paroxysmal atrial fibrillation ([address]  Patient recently was back in A. fib set her heart is back in rhythm today continue follow-up with cardiology and anticoagulation  3. Chronic venous insufficiency  Chronic venous stasis with chronic cellulitis of her legs and recurrent acute episodes of been quite severe in nature we will work on improving better sugar control to help with this in addition to working on the edema. She will continue to do her venous pumps and follow-up with infectious disease  No follow-ups on file. Subjective   HPI patient in today for follow-up on diabetes paroxysmal A. fib and chronic venous stasis with recurrent episodes of cellulitis. Patient feels well at this time. Actually done the best she has in quite some time sugars are doing much better and I reviewed those with her today  Review of Systems   Constitutional:  Negative for activity change and fever. HENT:  Negative for congestion and ear pain. Respiratory:  Negative for cough and shortness of breath. Cardiovascular:  Negative for chest pain and leg swelling. Gastrointestinal:  Negative for constipation, diarrhea, nausea and vomiting. Genitourinary:  Negative for dysuria and urgency. Skin:  Negative for rash.    Neurological:  Negative for dizziness, weakness and headaches. Objective   Patient-Reported Vitals  No data recorded     Physical Exam  [INSTRUCTIONS:  \"[x]\" Indicates a positive item  \"[]\" Indicates a negative item  -- DELETE ALL ITEMS NOT EXAMINED]    Constitutional: [x] Appears well-developed and well-nourished [x] No apparent distress      [] Abnormal -     Mental status: [x] Alert and awake  [x] Oriented to person/place/time [x] Able to follow commands    [] Abnormal -     Eyes:   EOM    [x]  Normal    [] Abnormal -   Sclera  [x]  Normal    [] Abnormal -          Discharge [x]  None visible   [] Abnormal -     HENT: [x] Normocephalic, atraumatic  [] Abnormal -   [x] Mouth/Throat: Mucous membranes are moist    External Ears [x] Normal  [] Abnormal -    Neck: [x] No visualized mass [] Abnormal -     Pulmonary/Chest: [x] Respiratory effort normal   [x] No visualized signs of difficulty breathing or respiratory distress        [] Abnormal -      Musculoskeletal:   [x] Normal gait with no signs of ataxia         [x] Normal range of motion of neck        [] Abnormal -     Neurological:        [x] No Facial Asymmetry (Cranial nerve 7 motor function) (limited exam due to video visit)          [x] No gaze palsy        [] Abnormal -          Skin:        [x] No significant exanthematous lesions or discoloration noted on facial skin         [] Abnormal -            Psychiatric:       [x] Normal Affect [] Abnormal -        [x] No Hallucinations    Other pertinent observable physical exam findings:-             Adriana Nichole, was evaluated through a synchronous (real-time) audio-video encounter. The patient (or guardian if applicable) is aware that this is a billable service, which includes applicable co-pays. This Virtual Visit was conducted with patient's (and/or legal guardian's) consent.  The visit was conducted pursuant to the emergency declaration under the [facility]5 waiver authority and the Coronavirus Preparedness and Response Supplemental Appropriations Act. Patient identification was verified, and a caregiver was present when appropriate. The patient was located at Home: [address] [place] [id number].    Provider was located at [facility] ([address] [place] [address]): [address] [facility]  Rubi Dunn MD

## 2022-11-04 LAB
ALBUMIN SERPL-MCNC: NORMAL G/DL
ALP BLD-CCNC: NORMAL U/L
ALT SERPL-CCNC: NORMAL U/L
ANION GAP SERPL CALCULATED.3IONS-SCNC: NORMAL MMOL/L
AST SERPL-CCNC: NORMAL U/L
BASOPHILS ABSOLUTE: 0.1 /ΜL
BASOPHILS RELATIVE PERCENT: 0.5 %
BILIRUB SERPL-MCNC: NORMAL MG/DL
BUN BLDV-MCNC: 26 MG/DL
CALCIUM SERPL-MCNC: 9.9 MG/DL
CHLORIDE BLD-SCNC: 92 MMOL/L
CO2: 27 MMOL/L
CREAT SERPL-MCNC: 1.51 MG/DL
EOSINOPHILS ABSOLUTE: 0 /ΜL
EOSINOPHILS RELATIVE PERCENT: 0.1 %
GFR CALCULATED: NORMAL
GLUCOSE BLD-MCNC: 206 MG/DL
HCT VFR BLD CALC: 37.7 % (ref 36–46)
HEMOGLOBIN: 11.9 G/DL (ref 12–16)
LYMPHOCYTES ABSOLUTE: 0.7 /ΜL
LYMPHOCYTES RELATIVE PERCENT: 4.1 %
MCH RBC QN AUTO: 27.3 PG
MCHC RBC AUTO-ENTMCNC: 31.5 G/DL
MCV RBC AUTO: 86.7 FL
MONOCYTES ABSOLUTE: 0.6 /ΜL
MONOCYTES RELATIVE PERCENT: 3.4 %
NEUTROPHILS ABSOLUTE: 15 /ΜL
NEUTROPHILS RELATIVE PERCENT: 91.9 %
PDW BLD-RTO: 17.9 %
PLATELET # BLD: 311 K/ΜL
PMV BLD AUTO: 7.7 FL
POTASSIUM SERPL-SCNC: 4.2 MMOL/L
RBC # BLD: 4.35 10^6/ΜL
SODIUM BLD-SCNC: 129 MMOL/L
TOTAL PROTEIN: NORMAL
WBC # BLD: 16.4 10^3/ML

## 2022-11-07 ENCOUNTER — OFFICE VISIT (OUTPATIENT)
Dept: FAMILY MEDICINE CLINIC | Age: 67
End: 2022-11-07
Payer: MEDICARE

## 2022-11-07 VITALS
RESPIRATION RATE: 20 BRPM | DIASTOLIC BLOOD PRESSURE: 70 MMHG | HEIGHT: 65 IN | SYSTOLIC BLOOD PRESSURE: 120 MMHG | OXYGEN SATURATION: 95 % | HEART RATE: 61 BPM | BODY MASS INDEX: 48.82 KG/M2 | TEMPERATURE: 97.8 F | WEIGHT: 293 LBS

## 2022-11-07 DIAGNOSIS — E11.22 TYPE 2 DIABETES MELLITUS WITH STAGE 3A CHRONIC KIDNEY DISEASE, WITH LONG-TERM CURRENT USE OF INSULIN (HCC): ICD-10-CM

## 2022-11-07 DIAGNOSIS — L03.116 CELLULITIS OF LEFT LOWER LEG: Primary | ICD-10-CM

## 2022-11-07 DIAGNOSIS — N18.31 TYPE 2 DIABETES MELLITUS WITH STAGE 3A CHRONIC KIDNEY DISEASE, WITH LONG-TERM CURRENT USE OF INSULIN (HCC): ICD-10-CM

## 2022-11-07 DIAGNOSIS — Z79.4 TYPE 2 DIABETES MELLITUS WITH STAGE 3A CHRONIC KIDNEY DISEASE, WITH LONG-TERM CURRENT USE OF INSULIN (HCC): ICD-10-CM

## 2022-11-07 DIAGNOSIS — E66.01 CLASS 3 SEVERE OBESITY WITH SERIOUS COMORBIDITY AND BODY MASS INDEX (BMI) OF 50.0 TO 59.9 IN ADULT, UNSPECIFIED OBESITY TYPE (HCC): ICD-10-CM

## 2022-11-07 DIAGNOSIS — I48.0 PAROXYSMAL ATRIAL FIBRILLATION (HCC): ICD-10-CM

## 2022-11-07 PROCEDURE — G8400 PT W/DXA NO RESULTS DOC: HCPCS | Performed by: NURSE PRACTITIONER

## 2022-11-07 PROCEDURE — G8482 FLU IMMUNIZE ORDER/ADMIN: HCPCS | Performed by: NURSE PRACTITIONER

## 2022-11-07 PROCEDURE — G8417 CALC BMI ABV UP PARAM F/U: HCPCS | Performed by: NURSE PRACTITIONER

## 2022-11-07 PROCEDURE — 3017F COLORECTAL CA SCREEN DOC REV: CPT | Performed by: NURSE PRACTITIONER

## 2022-11-07 PROCEDURE — 2022F DILAT RTA XM EVC RTNOPTHY: CPT | Performed by: NURSE PRACTITIONER

## 2022-11-07 PROCEDURE — G8427 DOCREV CUR MEDS BY ELIG CLIN: HCPCS | Performed by: NURSE PRACTITIONER

## 2022-11-07 PROCEDURE — 3052F HG A1C>EQUAL 8.0%<EQUAL 9.0%: CPT | Performed by: NURSE PRACTITIONER

## 2022-11-07 PROCEDURE — 1123F ACP DISCUSS/DSCN MKR DOCD: CPT | Performed by: NURSE PRACTITIONER

## 2022-11-07 PROCEDURE — 1036F TOBACCO NON-USER: CPT | Performed by: NURSE PRACTITIONER

## 2022-11-07 PROCEDURE — 99203 OFFICE O/P NEW LOW 30 MIN: CPT | Performed by: NURSE PRACTITIONER

## 2022-11-07 PROCEDURE — 1090F PRES/ABSN URINE INCON ASSESS: CPT | Performed by: NURSE PRACTITIONER

## 2022-11-07 RX ORDER — ALUMINUM HYDROXIDE, MAGNESIUM HYDROXIDE, DIMETHICONE 400; 400; 40 MG/5ML; MG/5ML; MG/5ML
LIQUID ORAL
COMMUNITY
Start: 2022-10-18 | End: 2022-11-17

## 2022-11-07 RX ORDER — CEPHALEXIN 500 MG/1
500 CAPSULE ORAL 4 TIMES DAILY
COMMUNITY
Start: 2022-11-04 | End: 2022-11-14

## 2022-11-07 RX ORDER — AMIODARONE HYDROCHLORIDE 200 MG/1
200 TABLET ORAL DAILY
COMMUNITY
Start: 2022-10-18

## 2022-11-07 RX ORDER — CANAGLIFLOZIN 300 MG/1
300 TABLET, FILM COATED ORAL
COMMUNITY
Start: 2022-10-11

## 2022-11-07 ASSESSMENT — ENCOUNTER SYMPTOMS
GASTROINTESTINAL NEGATIVE: 1
SHORTNESS OF BREATH: 0
COLOR CHANGE: 1
COUGH: 0
WHEEZING: 0

## 2022-11-07 NOTE — PROGRESS NOTES
[address] [address]  Dept: [phone]  Loc: [phone]    Josué Styles (:  1955) is a 69 y.o. female,New patient, here for evaluation of the following chief complaint(s):  Follow-Up from Hospital ([facility]- Cellulitis )      ASSESSMENT/PLAN:  1. Cellulitis of left lower leg  See picture of leg. Does not look to be improving as much as hoped. She was in the ER on Friday and received IV infusion and sent home on oral antibiotic. Her ID specialist is Dr. Jennifer Foote in [facility]. She was set up with an appointment to see him today at 1:00. I think its best for him to develop the best action plan. Donita Willoughby is a patient of Ilsa Fermin, this is my first time seeing her. She does have an extensive medical history. 2. Paroxysmal atrial fibrillation (HCC)  Stable. Continue on amiodarone. Metoprolol and eliquis. 3. Type 2 diabetes mellitus with stage 3a chronic kidney disease, with long-term current use of insulin (HCC)  Last A1C in  was 8.3%  Folllowing with Dr. Antonette Pelaez in December. 4. Class 3 severe obesity with serious comorbidity and body mass index (BMI) of 50.0 to 59.9 in adult, unspecified obesity type [facility])    Health maintenance labs and vaccines declined at today's visit. Return in about 1 week (around 2022) for  has appointment with Dr. Antonette Pelaez . SUBJECTIVE/OBJECTIVE:  Went to ER on Friday, they wanted to admit her, but she did not want to. Infectious Disease and Dr. Jarett murphy   Infectious Disease MD felt comfortable with Infusion and sent home on oral antibiotics. Patient reports redness is better in her left leg, but she does have more swelling. Denies any fever or chills. has a past medical history of CAD (coronary artery disease). Review of Systems   Constitutional:  Negative for chills, diaphoresis, fatigue and fever.    Respiratory:  Negative for cough, shortness of breath and wheezing. Cardiovascular:  Positive for leg swelling (left lower extremity). Gastrointestinal: Negative. Skin:  Positive for color change (left foot and lower leg with redness and swelling). Physical Exam  Vitals reviewed. Constitutional:       Appearance: Normal appearance. She is obese. HENT:      Head: Normocephalic and atraumatic. Nose: Nose normal.   Cardiovascular:      Rate and Rhythm: Normal rate. Pulmonary:      Effort: Pulmonary effort is normal. No respiratory distress. Breath sounds: Normal breath sounds. No stridor. No wheezing, rhonchi or rales. Chest:      Chest wall: No tenderness. Musculoskeletal:      Cervical back: Normal range of motion and neck supple. Left lower leg: Swelling present. 3+ Edema present. Skin:     Findings: Erythema present. Comments: Left lower leg and foot with light erythema, skin with warmth. Pulses +2   Left leg with edema, pitting    Neurological:      Mental Status: She is alert and oriented to person, place, and time. An electronic signature was used to authenticate this note.     --SANA Bruner - CNP

## 2022-11-08 ASSESSMENT — ENCOUNTER SYMPTOMS
CONSTIPATION: 0
COUGH: 0
SHORTNESS OF BREATH: 0
NAUSEA: 0
DIARRHEA: 0
VOMITING: 0

## 2022-11-15 ENCOUNTER — OFFICE VISIT (OUTPATIENT)
Dept: INTERNAL MEDICINE CLINIC | Age: 67
End: 2022-11-15
Payer: MEDICARE

## 2022-11-15 ENCOUNTER — TELEPHONE (OUTPATIENT)
Dept: INTERNAL MEDICINE CLINIC | Age: 67
End: 2022-11-15

## 2022-11-15 VITALS
SYSTOLIC BLOOD PRESSURE: 124 MMHG | HEART RATE: 77 BPM | DIASTOLIC BLOOD PRESSURE: 72 MMHG | TEMPERATURE: 97.7 F | BODY MASS INDEX: 48.82 KG/M2 | WEIGHT: 293 LBS | HEIGHT: 65 IN

## 2022-11-15 DIAGNOSIS — Z79.4 TYPE 2 DIABETES MELLITUS WITH STAGE 3A CHRONIC KIDNEY DISEASE, WITH LONG-TERM CURRENT USE OF INSULIN (HCC): Primary | ICD-10-CM

## 2022-11-15 DIAGNOSIS — N18.31 TYPE 2 DIABETES MELLITUS WITH STAGE 3A CHRONIC KIDNEY DISEASE, WITH LONG-TERM CURRENT USE OF INSULIN (HCC): Primary | ICD-10-CM

## 2022-11-15 DIAGNOSIS — E11.22 TYPE 2 DIABETES MELLITUS WITH STAGE 3A CHRONIC KIDNEY DISEASE, WITH LONG-TERM CURRENT USE OF INSULIN (HCC): Primary | ICD-10-CM

## 2022-11-15 LAB — HBA1C MFR BLD: 6.6 % (ref 4.3–5.7)

## 2022-11-15 PROCEDURE — G0108 DIAB MANAGE TRN  PER INDIV: HCPCS | Performed by: INTERNAL MEDICINE

## 2022-11-15 PROCEDURE — 83036 HEMOGLOBIN GLYCOSYLATED A1C: CPT | Performed by: INTERNAL MEDICINE

## 2022-11-15 RX ORDER — INSULIN LISPRO 100 [IU]/ML
INJECTION, SOLUTION INTRAVENOUS; SUBCUTANEOUS 3 TIMES DAILY
COMMUNITY

## 2022-11-15 RX ORDER — LANOLIN ALCOHOL/MO/W.PET/CERES
3 CREAM (GRAM) TOPICAL NIGHTLY PRN
COMMUNITY

## 2022-11-15 RX ORDER — ASCORBIC ACID 500 MG
500 TABLET ORAL DAILY
COMMUNITY

## 2022-11-15 RX ORDER — ESCITALOPRAM OXALATE 20 MG/1
20 TABLET ORAL DAILY
COMMUNITY

## 2022-11-15 NOTE — TELEPHONE ENCOUNTER
Current diabetic medications include:          Victoza 1.8mg daily          Humalog group 2 scale only               -150= 2units, 151-200=4 units, 201-250=6 units,                         251-300 =8 units,301-350 = 10 units          Levemir 80 units in AM.  65-70 units evening if BS                      Is under 120. 80 units if BG iv over 165           Metformin 500mg 2 times per day           Invokana 300mg daily    Home blood sugar records: fasting range: 150-179                   Noon 122-207                   Dinner 121-187                   Bed 175-211    Weight loss is desired. Current A1C today 6.6%  Hermelinda Stanley would be a great candidate for a Libre2 CGM-we will submit a request for this. She may also benefit from once weekly GLP1 for ease of administration as well as potential for higher dosing. We have initiated instructions on meal planning and exercise goals.

## 2022-11-15 NOTE — PROGRESS NOTES
Diabetes Mellitus Type II, Initial Visit:   Dr. Brown Alicia  Patient here for an initial evaluation of Type 2 diabetes mellitus. Current symptoms/problems include  cellulitis  and have been unchanged. .  The patient was initially diagnosed with Type 2 diabetes mellitus :  2017. Known diabetic complications: nephropathy, peripheral neuropathy, and peripheral vascular disease  Cardiovascular risk factors: advanced age (older than 54 for men, 72 for women), diabetes mellitus, dyslipidemia, hypertension, obesity (BMI >= 30 kg/m2), and sedentary lifestyle  Current diabetic medications include:          Victoza 1.8mg daily          Humalog group 2 scale only          Levemir 80 units in AM.  65-70 units evening if BS                      Is under 120. 80 units if BG iv over 165           Metformin 500mg 2 times per day           Invokana 300mg daily  Eye exam current (within one year): yes 2/2022 Dr. Judith Croft                         No hx.  Weight trend: decreasing steadily. Down 50 pounds in the past year; intentional  Prior visit with dietician: no  Current diet: B cherrios                       Snacks  Glucerna or 6oz milk and string cheese  Current exercise: physical therapy exercises daily 2 times per day up to 20 total minutes of exercise efforts    Current monitoring regimen: home blood tests - 4 times daily  Home blood sugar records: fasting range: 150-179                   Noon 122-207                   Dinner 121-187                   Bed 175-211  Blood sugar in the office today 157, 1.5hrpp  Any episodes of hypoglycemia? yes - s/s with BS  90's;                   Shaky/ unsteady on feet. Tx with low BS. High risk waking up 8-9am    Is She on ACE inhibitor or angiotensin II receptor blocker? Yes   lisinopril (Prinivil)    Focus  Initial visit for Diabetes education. A1C  6.6 11/15/22. James Rodriguez reports having Diabetes for about 5 years and has not had good control.  She recently was hospitalized for cellulitis. She been taking her Diabetes more serious in the past months. She has lost approx 50 pounds in the past year. She is doing physical therapy exercises daily and is checking her blood sugars 3-4 times daily. Lencho Moreno is using a scale for her mealtime insulin; starting with 2 units for -150. Discussed CGM and she is interested. She could also benefit from increased meal coverage and weekly GLP1 therapy vs daily. Will discuss these options with her provider. Discussed meal plan and exercise goals. Follow up 2 weeks. Plan  Reviewed physiology of Diabetes, BS goals; carbs; exercise; CGM; medication actions; treating low BS  Goals for meal planning:    3 meals per day:  about 45 grams or 3 servings of carbohydrate per meal                      Plus protein and low carbohydrate vegetables      3 snacks per day: about 15 grams or 1 servings of carbohydrate                           Per snack plus protein and low carb veggies  Check blood sugars 4 times per day              --before meals and bedtime            Goal for blood sugars before meals:                 If you check 2 hours after finishing a meal:  goal is under 160/180      -we will check on the [employer] continuous glucose monitor  Exercise goal.  Your physical therapy exercise daily              And 10 minutes Nicklas Click chair exercise               And/ or stretch band exercises for 5-10 minutes  Always carry something with you for a low blood sugar = glucose tablets  Meter download, medications, PMH and nursing assessment reviewed. Terrence Wiggins states She is willing to participate in this plan of care and verbalized understanding of all instructions provided. Teach back used to verify comprehension. Total time involved in direct patient education: 60 minutes.

## 2022-11-15 NOTE — PATIENT INSTRUCTIONS
Goals for meal planning:    3 meals per day:  about 45 grams or 3 servings of carbohydrate per meal                      Plus protein and low carbohydrate vegetables      3 snacks per day: about 15 grams or 1 servings of carbohydrate                           Per snack plus protein and low carb veggies  Check blood sugars 4 times per day              --before meals and bedtime            Goal for blood sugars before meals:                 If you check 2 hours after finishing a meal:  goal is under 160/180      -we will check on the Sindy continuous glucose monitor  Exercise goal.  Your physical therapy exercise daily              And 10 minutes Sherrine Ion chair exercise               And/ or stretch band exercises for 5-10 minutes  Always carry something with you for a low blood sugar = glucose tablets

## 2022-12-05 ENCOUNTER — NURSE ONLY (OUTPATIENT)
Dept: LAB | Age: 67
End: 2022-12-05

## 2022-12-05 DIAGNOSIS — N18.31 TYPE 2 DIABETES MELLITUS WITH STAGE 3A CHRONIC KIDNEY DISEASE, WITH LONG-TERM CURRENT USE OF INSULIN (HCC): ICD-10-CM

## 2022-12-05 DIAGNOSIS — E11.22 TYPE 2 DIABETES MELLITUS WITH STAGE 3A CHRONIC KIDNEY DISEASE, WITH LONG-TERM CURRENT USE OF INSULIN (HCC): ICD-10-CM

## 2022-12-05 DIAGNOSIS — Z79.4 TYPE 2 DIABETES MELLITUS WITH STAGE 3A CHRONIC KIDNEY DISEASE, WITH LONG-TERM CURRENT USE OF INSULIN (HCC): ICD-10-CM

## 2022-12-05 LAB
ALBUMIN SERPL-MCNC: 4.1 G/DL (ref 3.5–5.1)
ALP BLD-CCNC: 74 U/L (ref 38–126)
ALT SERPL-CCNC: 9 U/L (ref 11–66)
ANION GAP SERPL CALCULATED.3IONS-SCNC: 12 MEQ/L (ref 8–16)
AST SERPL-CCNC: 15 U/L (ref 5–40)
BILIRUB SERPL-MCNC: 0.3 MG/DL (ref 0.3–1.2)
BUN BLDV-MCNC: 33 MG/DL (ref 7–22)
CALCIUM SERPL-MCNC: 10.2 MG/DL (ref 8.5–10.5)
CHLORIDE BLD-SCNC: 97 MEQ/L (ref 98–111)
CO2: 26 MEQ/L (ref 23–33)
CREAT SERPL-MCNC: 1.3 MG/DL (ref 0.4–1.2)
GFR SERPL CREATININE-BSD FRML MDRD: 44 ML/MIN/1.73M2
GLUCOSE FASTING: 136 MG/DL (ref 70–108)
POTASSIUM SERPL-SCNC: 4.7 MEQ/L (ref 3.5–5.2)
SODIUM BLD-SCNC: 135 MEQ/L (ref 135–145)
TOTAL PROTEIN: 7.1 G/DL (ref 6.1–8)

## 2022-12-06 ENCOUNTER — NURSE ONLY (OUTPATIENT)
Dept: INTERNAL MEDICINE CLINIC | Age: 67
End: 2022-12-06
Payer: MEDICARE

## 2022-12-06 ENCOUNTER — OFFICE VISIT (OUTPATIENT)
Dept: INTERNAL MEDICINE CLINIC | Age: 67
End: 2022-12-06
Payer: MEDICARE

## 2022-12-06 VITALS
WEIGHT: 293 LBS | BODY MASS INDEX: 48.82 KG/M2 | SYSTOLIC BLOOD PRESSURE: 133 MMHG | HEART RATE: 68 BPM | DIASTOLIC BLOOD PRESSURE: 63 MMHG | TEMPERATURE: 97.1 F | HEIGHT: 65 IN

## 2022-12-06 DIAGNOSIS — E11.69 TYPE 2 DIABETES MELLITUS WITH OTHER SPECIFIED COMPLICATION, WITH LONG-TERM CURRENT USE OF INSULIN (HCC): Primary | ICD-10-CM

## 2022-12-06 DIAGNOSIS — Z79.4 TYPE 2 DIABETES MELLITUS WITH OTHER SPECIFIED COMPLICATION, WITH LONG-TERM CURRENT USE OF INSULIN (HCC): Primary | ICD-10-CM

## 2022-12-06 DIAGNOSIS — E11.9 TYPE 2 DIABETES MELLITUS WITHOUT COMPLICATION, UNSPECIFIED WHETHER LONG TERM INSULIN USE (HCC): Primary | ICD-10-CM

## 2022-12-06 PROCEDURE — G0108 DIAB MANAGE TRN  PER INDIV: HCPCS | Performed by: INTERNAL MEDICINE

## 2022-12-06 PROCEDURE — 97804 MEDICAL NUTRITION GROUP: CPT | Performed by: DIETITIAN, REGISTERED

## 2022-12-06 NOTE — PROGRESS NOTES
[facility]  [facility]. [id number] [address], [facility], [address]  [phone] (phone)  [phone] (fax)    Patient ID: Svitlana Machuca [id number]  Referring Provider: Dr. Toro Vargas     Patient's name and  were verified. Subjective:    She presents for Her follow-up diabetic visit. She has type 2 diabetes mellitus. Home regimen includes: Insulin, Biguanide, GLP-1 Agonist, and SGLT-2 inhibitors She is noncompliant some of the time. Assessment:     Lab Results   Component Value Date/Time    LABA1C 6.6 11/15/2022 11:11 AM    LABA1C 8.3 2022 11:09 AM    BUN 33 2022 10:52 AM    CREATININE 1.3 2022 10:52 AM     Vitals:    22 1254   BP: 133/63   Site: Right Upper Arm   Position: Sitting   Pulse: 68   Temp: 97.1 °F (36.2 °C)   Weight: (!) 313 lb 9.6 oz (142.2 kg)   Height: 5' 5\" (1.651 m)     Wt Readings from Last 3 Encounters:   22 (!) 314 lb (142.4 kg)   22 (!) 313 lb 9.6 oz (142.2 kg)   11/15/22 (!) 312 lb 3.2 oz (141.6 kg)     Ht Readings from Last 3 Encounters:   22 5' 5\" (1.651 m)   22 5' 5\" (1.651 m)   11/15/22 5' 5\" (1.651 m)       Diabetes Pharmacotherapy:  Mounjaro 2.5mg weekly; Saturday  Metfromin 500mg BID  Invokanna 300mg daily  Levemir 80 unit sin AM and 80 units in the evening  Humalog group 2 scale plus 2 units. Adds 2 units for    Glucose Trends:   Current monitoring regimen: Fingerstick blood tests - 4 times daily  Home blood sugar trends:    -Fasting AM: 114-152   -Before lunch: 124-223   -Before dinner: 117-179   -Bedtime: 151-202  Any episodes of hypoglycemia?  Felt low at 112   -Treats with glucose tablets; orange juice    Lifestyle Factors:   Previous visit with dietician: no  Current diet: B: Cherrios/ milk                                  1-1.5 cup/ 1 cup                           Glucerna carb smart granola bar/fruit                       L: chicken dumplings                             Sausage sandwich/ cheese stick                       D: chili soup; peanut buter/                       Snacks: apple/ nuts/ peanut butter                       Beverages:  Current exercise: 10-15 minutes per day                    Moving around the house                  Steps per day 1200 steps per day    Health Maintenance:  Eye exam current (within one year): yes Date: 2/2022 , Dr. Loni Jain  Any history of foot problems? no  Foot exam up to date: yes Date: [facility], 2022                       Sore under 1st toe right foot  Immunizations up to date:    Pneumonia: yes   Influenza: yes  The 10-year ASCVD risk score (Jaylene STONE, et al., 2019) is: 16.3%    Values used to calculate the score:      Age: 69 years      Sex: Female      Is Non- : No      Diabetic: Yes      Tobacco smoker: No      Systolic Blood Pressure: 605 mmHg      Is BP treated: No      HDL Cholesterol: 47 mg/dL      Total Cholesterol: 196 mg/dl   Last panel - LDL:   Lab Results   Component Value Date    LDLCALC 99 07/26/2022     Taking ASA:  No   Taking statin: No - Not identified  Last microalbumin/creatinine ratio: No results found for: MCACR, MALBCR, MALBCRRATEXT   Taking ACE/ARB: Yes- lisinopril  Depression screening completed. 7/28/2022    Focus:   Diabetes Education. Last A1C: 6.6% 11/15/22. Weight stable for the past 2 months; down 6 pounds in past 3 months. We are still waiting to see if she will get the hike CGM. Overall, Wes Beauchamp is doing well. Encouraged present efforts for glucose control and weight loss. Curriculum Area Focus: Carbohydrate counting/meal planning, Physical activity goals, Hypoglycemia management, and Pattern management  DSME PLAN:     Continue with meal plan efforts           --lower fat meat choices          --3 servings or about 45 grams carbohydrate at each meal                  For snacks -limit to only 15 grams or 1 serving with protein          --keep adding vegetables to your meals  Good job with exercise efforts.  Keep adding a few steps to your walking             Goal each day/ week            --add stretch bands exercise for about 10 minutes sitting in the            Chair right after dinner. Can also do after breakfast                 Arms and legs!!! Keep checking your blood sugars before meals and bedtime               Goal:         -you should be hearing about the [facility] continuous glucose monitor                Soon             Help with starting the [employer]:  [email or web address]                                                                --support tab   Goals Addressed                   This Visit's Progress     3 meals per day; 45 grams carb per meal. snacks 15 grams   Not on track     check BS's 3-4 times daily; before meals   On track     Exercise 10 minutes 2 times per day   On track           Meter download, medications, PMH and nursing assessment reviewed. Alicia Hunter states She is willing to participate in this plan of care and verbalized understanding of all instructions provided. Teach back used to verify comprehension. Follow-up: 1 month    Total time involved in direct patient education: 60 minutes.

## 2022-12-06 NOTE — PROGRESS NOTES
Patient presented for the Diabetes [facility]  education class. The content was presented via PowerPoint, lecture and case-based format covering the following concepts: 60mins education. What is Diabetes?   Define glycosolation  Interpretation of the A1C and goal.  Pancreatic function  Target organs and macro and microvascular complications  Goals for SGM BP goals  Meal clearance   S&S hyper/hypoglycemia and tx   Insulin physiology-basal/bolus  Navigating sick days stress  Relationship between diet, exercise, meds and stress   Utilizing subhash care system at appropriate time  Assuming responsibility in self management  Troubleshooting patterns

## 2022-12-06 NOTE — PATIENT INSTRUCTIONS
Continue with meal plan efforts           --lower fat meat choices          --3 servings or about 45 grams carbohydrate at each meal                  For snacks -limit to only 15 grams or 1 serving with protein          --keep adding vegetables to your meals  Good job with exercise efforts. Keep adding a few steps to your walking             Goal each day/ week            --add stretch bands exercise for about 10 minutes sitting in the            Chair right after dinner. Can also do after breakfast                 Arms and legs!!! Keep checking your blood sugars before meals and bedtime               Goal:         -you should be hearing about the [facility] continuous glucose monitor                Soon             Help with starting the [employer]:  [email or web address]                                                                --support tab

## 2022-12-08 ENCOUNTER — OFFICE VISIT (OUTPATIENT)
Dept: FAMILY MEDICINE CLINIC | Age: 67
End: 2022-12-08
Payer: MEDICARE

## 2022-12-08 VITALS
SYSTOLIC BLOOD PRESSURE: 128 MMHG | BODY MASS INDEX: 48.82 KG/M2 | HEART RATE: 68 BPM | DIASTOLIC BLOOD PRESSURE: 78 MMHG | OXYGEN SATURATION: 97 % | WEIGHT: 293 LBS | HEIGHT: 65 IN

## 2022-12-08 DIAGNOSIS — F32.A DEPRESSIVE DISORDER: ICD-10-CM

## 2022-12-08 DIAGNOSIS — I48.0 PAROXYSMAL ATRIAL FIBRILLATION (HCC): Primary | ICD-10-CM

## 2022-12-08 DIAGNOSIS — Z79.4 TYPE 2 DIABETES MELLITUS WITH STAGE 3A CHRONIC KIDNEY DISEASE, WITH LONG-TERM CURRENT USE OF INSULIN (HCC): ICD-10-CM

## 2022-12-08 DIAGNOSIS — I10 PRIMARY HYPERTENSION: ICD-10-CM

## 2022-12-08 DIAGNOSIS — E11.22 TYPE 2 DIABETES MELLITUS WITH STAGE 3A CHRONIC KIDNEY DISEASE, WITH LONG-TERM CURRENT USE OF INSULIN (HCC): ICD-10-CM

## 2022-12-08 DIAGNOSIS — E78.2 MIXED HYPERLIPIDEMIA: ICD-10-CM

## 2022-12-08 DIAGNOSIS — N18.31 TYPE 2 DIABETES MELLITUS WITH STAGE 3A CHRONIC KIDNEY DISEASE, WITH LONG-TERM CURRENT USE OF INSULIN (HCC): ICD-10-CM

## 2022-12-08 DIAGNOSIS — I87.2 CHRONIC VENOUS INSUFFICIENCY: ICD-10-CM

## 2022-12-08 PROCEDURE — G8417 CALC BMI ABV UP PARAM F/U: HCPCS | Performed by: FAMILY MEDICINE

## 2022-12-08 PROCEDURE — G8427 DOCREV CUR MEDS BY ELIG CLIN: HCPCS | Performed by: FAMILY MEDICINE

## 2022-12-08 PROCEDURE — 1090F PRES/ABSN URINE INCON ASSESS: CPT | Performed by: FAMILY MEDICINE

## 2022-12-08 PROCEDURE — 3074F SYST BP LT 130 MM HG: CPT | Performed by: FAMILY MEDICINE

## 2022-12-08 PROCEDURE — 99214 OFFICE O/P EST MOD 30 MIN: CPT | Performed by: FAMILY MEDICINE

## 2022-12-08 PROCEDURE — 3044F HG A1C LEVEL LT 7.0%: CPT | Performed by: FAMILY MEDICINE

## 2022-12-08 PROCEDURE — G8482 FLU IMMUNIZE ORDER/ADMIN: HCPCS | Performed by: FAMILY MEDICINE

## 2022-12-08 PROCEDURE — 3078F DIAST BP <80 MM HG: CPT | Performed by: FAMILY MEDICINE

## 2022-12-08 PROCEDURE — 1123F ACP DISCUSS/DSCN MKR DOCD: CPT | Performed by: FAMILY MEDICINE

## 2022-12-08 PROCEDURE — 3017F COLORECTAL CA SCREEN DOC REV: CPT | Performed by: FAMILY MEDICINE

## 2022-12-08 PROCEDURE — G8400 PT W/DXA NO RESULTS DOC: HCPCS | Performed by: FAMILY MEDICINE

## 2022-12-08 PROCEDURE — 2022F DILAT RTA XM EVC RTNOPTHY: CPT | Performed by: FAMILY MEDICINE

## 2022-12-08 PROCEDURE — 1036F TOBACCO NON-USER: CPT | Performed by: FAMILY MEDICINE

## 2022-12-08 RX ORDER — APIXABAN 5 MG/1
5 TABLET, FILM COATED ORAL 2 TIMES DAILY
COMMUNITY
Start: 2022-11-28

## 2022-12-08 ASSESSMENT — PATIENT HEALTH QUESTIONNAIRE - PHQ9
SUM OF ALL RESPONSES TO PHQ9 QUESTIONS 1 & 2: 0
SUM OF ALL RESPONSES TO PHQ QUESTIONS 1-9: 0
2. FEELING DOWN, DEPRESSED OR HOPELESS: 0
SUM OF ALL RESPONSES TO PHQ QUESTIONS 1-9: 0
1. LITTLE INTEREST OR PLEASURE IN DOING THINGS: 0

## 2022-12-08 ASSESSMENT — ENCOUNTER SYMPTOMS
COUGH: 0
ABDOMINAL PAIN: 0
DIARRHEA: 0
SORE THROAT: 0
TROUBLE SWALLOWING: 0
SHORTNESS OF BREATH: 0
VOMITING: 0
CONSTIPATION: 0

## 2022-12-08 NOTE — PROGRESS NOTES
[address] [phone]  Dept: [phone]     SUBJECTIVE     Adriana Nichole is a 69 y. o.female    Pt presents for follow up medications. Pt feeling ok since last visit- interval history and any new issues noted below:       Patient Active Problem List   Diagnosis    COVID-19    Atrial fibrillation (HCC)    Chronic venous insufficiency    Depressive disorder    Type 2 diabetes mellitus (HCC)    Hyperlipidemia    Obesity    Obstructive sleep apnea syndrome    Spinal stenosis of lumbar region       Current Outpatient Medications   Medication Sig Dispense Refill    ELIQUIS 5 MG TABS tablet Take 5 mg by mouth in the morning and at bedtime      Tirzepatide (MOUNJARO) 5 MG/0.5ML SOPN SC injection Inject 0.5 mLs into the skin once a week 4 Adjustable Dose Pre-filled Pen Syringe 0    Tirzepatide (MOUNJARO) 7.5 MG/0.5ML SOPN SC injection Inject 0.5 mLs into the skin once a week 4 Adjustable Dose Pre-filled Pen Syringe 0    Tirzepatide (MOUNJARO) 10 MG/0.5ML SOPN SC injection Inject 0.5 mLs into the skin once a week 4 Adjustable Dose Pre-filled Pen Syringe 0    mupirocin (BACTROBAN) 2 % ointment Apply topically daily Right big toe      Tirzepatide (MOUNJARO) 2.5 MG/0.5ML SOPN SC injection Inject 0.5 mLs into the skin once a week 1 Adjustable Dose Pre-filled Pen Syringe 5    escitalopram (LEXAPRO) 20 MG tablet Take 20 mg by mouth daily      insulin lispro, 1 Unit Dial, (HUMALOG/ADMELOG) 100 UNIT/ML SOPN Inject into the skin 3 times daily Inject with meals. Group 2 scale.       metFORMIN (GLUCOPHAGE) 500 MG tablet Take 500 mg by mouth 2 times daily (with meals)      melatonin 3 MG TABS tablet Take 3 mg by mouth nightly as needed      vitamin C (ASCORBIC ACID) 500 MG tablet Take 500 mg by mouth daily      amiodarone (CORDARONE) 200 MG tablet Take 200 mg by mouth daily      INVOKANA 300 MG TABS tablet Take 300 mg by mouth every morning (before breakfast) 124/72         Physical Exam  Vitals and nursing note reviewed. Constitutional:       General: She is not in acute distress. Appearance: Normal appearance. She is normal weight. She is not ill-appearing. HENT:      Head: Normocephalic and atraumatic. Right Ear: Tympanic membrane, ear canal and external ear normal.      Left Ear: Tympanic membrane, ear canal and external ear normal.      Nose: Nose normal.      Mouth/Throat:      Mouth: Mucous membranes are moist.      Pharynx: Oropharynx is clear. Eyes:      Extraocular Movements: Extraocular movements intact. Conjunctiva/sclera: Conjunctivae normal.      Pupils: Pupils are equal, round, and reactive to light. Cardiovascular:      Rate and Rhythm: Normal rate and regular rhythm. Pulses: Normal pulses. Heart sounds: No murmur heard. Pulmonary:      Effort: Pulmonary effort is normal. No respiratory distress. Breath sounds: Normal breath sounds. No wheezing. Abdominal:      General: Abdomen is flat. Bowel sounds are normal. There is no distension. Palpations: Abdomen is soft. There is no mass. Tenderness: There is no abdominal tenderness. There is no guarding or rebound. Musculoskeletal:         General: No swelling. Normal range of motion. Cervical back: Normal range of motion. No tenderness. Right lower leg: No edema. Left lower leg: No edema. Lymphadenopathy:      Cervical: No cervical adenopathy. Skin:     General: Skin is warm. Findings: No rash. Neurological:      General: No focal deficit present. Mental Status: She is alert. Psychiatric:         Mood and Affect: Mood normal.       No results found for this visit on 12/08/22.     Lab Results   Component Value Date    LABA1C 6.6 (H) 11/15/2022       Lab Results   Component Value Date    HDL 47 07/26/2022    LDLCALC 99 07/26/2022       The 10-year ASCVD risk score (Jaylene STONE, et al., 2019) is: 16.3%    Values used to calculate the score:      Age: 69 years      Sex: Female      Is Non- : No      Diabetic: Yes      Tobacco smoker: No      Systolic Blood Pressure: 869 mmHg      Is BP treated: No      HDL Cholesterol: 47 mg/dL      Total Cholesterol: 196 mg/dl    Lab Results   Component Value Date     12/05/2022    K 4.7 12/05/2022    CL 97 (L) 12/05/2022    CO2 26 12/05/2022    BUN 33 (H) 12/05/2022    CREATININE 1.3 (H) 12/05/2022    GLUCOSE 206 11/04/2022    CALCIUM 10.2 12/05/2022    PROT 7.1 12/05/2022    LABALBU 4.1 12/05/2022    BILITOT 0.3 12/05/2022    ALKPHOS 74 12/05/2022    AST 15 12/05/2022    ALT 9 (L) 12/05/2022    LABGLOM 44 (A) 12/05/2022     estimated creatinine clearance is 59 mL/min (A) (based on SCr of 1.3 mg/dL (H)).      Lab Results   Component Value Date    LABMICR < 1.20 07/27/2022       Lab Results   Component Value Date    TSH 1.570 07/26/2022       Lab Results   Component Value Date    WBC 16.4 11/04/2022    HGB 11.9 (A) 11/04/2022    HCT 37.7 11/04/2022    MCV 86.7 11/04/2022     11/04/2022       No results found for: PSA, PSADIA    Immunization History   Administered Date(s) Administered    COVID-19, PFIZER GRAY top, DO NOT Dilute, (age 15 y+), IM, 30 mcg/0.3 mL 08/29/2022    COVID-19, PFIZER PURPLE top, DILUTE for use, (age 15 y+), 30mcg/0.3mL 02/25/2021, 03/18/2021    Influenza A (Q3K7-44) Vaccine PF IM 11/30/2009    Influenza Virus Vaccine 01/21/2013, 09/26/2016, 09/26/2016    Influenza, AFLURIA (age 1 yrs+), FLUZONE, (age 10 mo+), MDV, 0.5mL 10/17/2018, 11/07/2019, 11/09/2020    Influenza, FLUARIX, FLULAVAL, FLUZONE (age 10 mo+) AND AFLURIA, (age 1 y+), PF, 0.5mL 10/17/2018, 11/07/2019, 11/09/2020, 10/18/2022    Influenza, Intradermal, Preservative free 01/21/2013    Pneumococcal Conjugate 13-valent (Demetrice Morse) 10/17/2018       Health Maintenance   Topic Date Due    Diabetic foot exam  Never done    GFR test (Diabetes, CKD 3-4, OR last GFR 15-59)  Never done    Hepatitis C screen  Never done    DTaP/Tdap/Td vaccine (1 - Tdap) Never done    Colorectal Cancer Screen  Never done    Breast cancer screen  Never done    Shingles vaccine (1 of 2) Never done    DEXA (modify frequency per FRAX score)  Never done    Pneumococcal 65+ years Vaccine (2 - PPSV23 if available, else PCV20) 10/17/2019    Annual Wellness Visit (AWV)  Never done    COVID-19 Vaccine (4 - Booster for Mcguire Peter series) 10/24/2022    Diabetic retinal exam  01/14/2023    Lipids  07/26/2023    Diabetic Alb to Cr ratio (uACR) test  07/27/2023    A1C test (Diabetic or Prediabetic)  11/15/2023    Depression Monitoring  12/08/2023    Flu vaccine  Completed    Hepatitis A vaccine  Aged Out    Hib vaccine  Aged Out    Meningococcal (ACWY) vaccine  Aged Out       Future Appointments   Date Time Provider Avril Beth   1/3/2023 11:00 AM Nicole Casarez RD, LD [facility] [place]   2/14/2023 11:00 AM Yimi Olmstead RN [facility] [place]   3/9/2023  2:00 PM Helene Alvarez MD [address] [facility] [place]         ASSESSMENT       Diagnosis Orders   1. Paroxysmal atrial fibrillation (HCC)  ELIQUIS 5 MG TABS tablet      2. Type 2 diabetes mellitus with stage 3a chronic kidney disease, with long-term current use of insulin (HCC)  Tirzepatide (MOUNJARO) 5 MG/0.5ML SOPN SC injection    Tirzepatide (MOUNJARO) 7.5 MG/0.5ML SOPN SC injection    Tirzepatide (MOUNJARO) 10 MG/0.5ML SOPN SC injection    Comprehensive Metabolic Panel, Fasting    Hemoglobin A1C    Lipid, Fasting    Microalbumin / Creatinine Urine Ratio    TSH with Reflex    Vitamin B12      3. Chronic venous insufficiency        4. Depressive disorder        5. Mixed hyperlipidemia  Comprehensive Metabolic Panel, Fasting    Lipid, Fasting    TSH with Reflex      6. Primary hypertension  CBC with Auto Differential    Comprehensive Metabolic Panel, Fasting    TSH with Reflex          PLAN      1.  Paroxysmal atrial fibrillation (HCC)  Patient in rhythm today but continues anticoagulated also continue beta-blocker  - ELIQUIS 5 MG TABS tablet; Take 5 mg by mouth in the morning and at bedtime    2. Type 2 diabetes mellitus with stage 3a chronic kidney disease, with long-term current use of insulin (HCC)  Sugars improving already on Monday Laya continue to titrate dose BMI exceedingly high would do much better if we can bring that down  - Tirzepatide (MOUNJARO) 5 MG/0.5ML SOPN SC injection; Inject 0.5 mLs into the skin once a week  Dispense: 4 Adjustable Dose Pre-filled Pen Syringe; Refill: 0  - Tirzepatide (MOUNJARO) 7.5 MG/0.5ML SOPN SC injection; Inject 0.5 mLs into the skin once a week  Dispense: 4 Adjustable Dose Pre-filled Pen Syringe; Refill: 0  - Tirzepatide (MOUNJARO) 10 MG/0.5ML SOPN SC injection; Inject 0.5 mLs into the skin once a week  Dispense: 4 Adjustable Dose Pre-filled Pen Syringe; Refill: 0  - Comprehensive Metabolic Panel, Fasting; Future  - Hemoglobin A1C; Future  - Lipid, Fasting; Future  - Microalbumin / Creatinine Urine Ratio; Future  - TSH with Reflex; Future  - Vitamin B12; Future    3. Chronic venous insufficiency  Venous insufficiency dramatically improved continue to watch salt elevate legs and use pumps    4. Depressive disorder  Well-controlled no change    5. Mixd hyperlipidemia  Tolerating meds well check labs as below  - Comprehensive Metabolic Panel, Fasting; Future  - Lipid, Fasting; Future  - TSH with Reflex; Future    6. Primary hypertension  Blood pressure for fluid goal today no change check electrolytes and renal function  - CBC with Auto Differential; Future  - Comprehensive Metabolic Panel, Fasting; Future  - TSH with Reflex; Future         Preventive Health Topics:  Pt declines HIV and HEP C screening at this time due to lack of risk factors. Encouraged COVID VACCINE  Encouraged FLU VACCINE after October 1st annually. Encouraged TETANUS SHOT (TdaP/ [facility]ACEL/ [address]) per personal pharmacy.   Encouraged PREVNAR 20 at this time  Encouraged SHINGLES SHOT [facility]) - check with insurance re coverage and location of administration.          Croey Aceves MD  3:09 PM  12/27/22

## 2022-12-19 ENCOUNTER — TELEPHONE (OUTPATIENT)
Dept: INTERNAL MEDICINE CLINIC | Age: 67
End: 2022-12-19

## 2022-12-19 NOTE — TELEPHONE ENCOUNTER
Patient called in and stated she was just discharged from [facility] on 12/18/22. She said she had a bout of cellulitis and was admitted for that. She said that her blood sugars have been all over the place. She was not able to report many to me, but this is what she provided. DATE FBS  HR PPD PRE-LUNCH  HR PPD PRE-DINNER  HR PPD BED   12/19/22 168 140        12/18/22 68     205 78   12/17/22       180   12/16/22      189 126     Please review and advise.   Thank you

## 2022-12-20 RX ORDER — PEN NEEDLE, DIABETIC 32 GX 1/4"
NEEDLE, DISPOSABLE MISCELLANEOUS
Qty: 100 EACH | Refills: 3 | Status: SHIPPED | OUTPATIENT
Start: 2022-12-20

## 2022-12-20 NOTE — TELEPHONE ENCOUNTER
Medications  Mounjaro 2.5mg weekly; Saturday  Metfromin 500mg BID  Invokanna 300mg daily  Levemir 80 unit sin AM and 80 units in the evening  Humalog group 2 scale plus 2 units. Adds 2 units for BS over 200    Call to Sebastien Owens. Blood sugars reported are variable and not significantly out of control. She reports that she is actually a little better at this time.  is in the hospital, making her current schedule variable; this doesn't help. Dr. Leta Gregory has increased Mounjaro to 5mg weekly. She is starting the increased dose this weekend. Instructed to watch for low BS's and report if there is a need to decrease insulin dosing as needed to prevent the lows. Sebastien Owens voiced understanding via teach back.

## 2022-12-20 NOTE — TELEPHONE ENCOUNTER
This medication refill is regarding a electronic request. Refill requested by  Clementine Bowers . Requested Prescriptions     Pending Prescriptions Disp Refills    NOVOFINE PEN NEEDLE 32G X 6 MM MISC [Pharmacy Med Name: Bladimir Foy  0     Sig: USE 6 TIMES DAILY, AS DIRECTED. Date of last visit: 12/8/2022   Date of next visit: 3/9/2023        Rx verified, ordered and set to EP.

## 2023-01-03 ENCOUNTER — OFFICE VISIT (OUTPATIENT)
Dept: INTERNAL MEDICINE CLINIC | Age: 68
End: 2023-01-03
Payer: MEDICARE

## 2023-01-03 VITALS — BODY MASS INDEX: 48.82 KG/M2 | WEIGHT: 293 LBS | HEIGHT: 65 IN

## 2023-01-03 DIAGNOSIS — E11.9 TYPE 2 DIABETES MELLITUS WITHOUT COMPLICATION, UNSPECIFIED WHETHER LONG TERM INSULIN USE (HCC): Primary | ICD-10-CM

## 2023-01-03 PROCEDURE — 97802 MEDICAL NUTRITION INDIV IN: CPT | Performed by: DIETITIAN, REGISTERED

## 2023-01-03 NOTE — PATIENT INSTRUCTIONS
1. )  Get familiar with reading the nutrition facts label by looking at serving size and total carbohydrates. - Without a label refer to your carb count guide booklet. 2.)  Measure foods for accuracy in carb counting.    3.)  Healthy carb choices:  whole grains, whole wheat pasta, starchy vegetables, fresh fruit and lowfat/non-fat milk and yogurt. 4.)  Your meal plan is found on the inside cover of your carb counting guide booklet:  1st meal or Brkf:  45 gms carbohydrates + 1-2 oz protein  2nd meal or Lunch: 45 gms carbohydrates + 2-3 oz protein + non-starchy veggies  3rd meal or Dinner:  45 gms carbohydrates + 2-3 oz protein + non- starchy veggies    Snack time:  15 - 20 gms carbohydrates + 1 oz protein x 1-2 snacks/day    5.)  Choose lean protein most of the time and Cut in 1/2 added fats to help with weight loss efforts. 6.) Bring a 1-2 week food log and Sindy reader to next dietitian appt. Thanks. Check BS:  4+/day  Fasting BS (1st thing in the morning) or before a meal (at least 4 hour since last ate), BS goal:  90 - 130  2 hours after the start of a meal, BS goal:  100 - 150     7.)  Keep doing your stretch bands for exercise. - Also try other chair exercises.

## 2023-01-03 NOTE — PROGRESS NOTES
[address] [place] [address]. [address] [place] [address] Jacob., [facility], [address]  [phone] (phone)  [phone] (fax)    Patient Name: Lyubov Rosen. Date of Birth: [id number]. MRN: [id number]      Assessment: Patient is a 69 y.o. 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     -Nutritionally relevant labs:   Lab Results   Component Value Date/Time    LABA1C 6.6 (H) 11/15/2022 11:11 AM    LABA1C 8.3 (H) 07/26/2022 11:09 AM    GLUCOSE 206 11/04/2022 12:00 AM    GLUCOSE 195 (H) 09/01/2022 07:55 PM    HDL 47 07/26/2022 11:09 AM    LDLCALC 99 07/26/2022 11:09 AM     12/30/22 - RN-CDE f/u visit:  Continue with meal plan efforts           --lower fat meat choices          --3 servings or about 45 grams carbohydrate at each meal                  For snacks -limit to only 15 grams or 1 serving with protein          --keep adding vegetables to your meals - trying. But having trouble with getting. Good job with exercise efforts. Keep adding a few steps to your walking             Goal each day/ week            --add stretch bands exercise for about 10 minutes sitting in the - not doing             Chair right after dinner. Can also do after breakfast                 Arms and legs!!! Keep checking your blood sugars before meals and bedtime               Goal:         -you should be hearing about the ANATOL[employer] MESS[facility] continuous glucose monitor pt has received her CGM and using. Soon             Help with starting the sindy:  [email or web address]. us                                                                --support tab  Today's Visit:    DB meds:  Monwilderro - titrating up, currently on 5 mg/week  Invokana 300 mg daily @ [facility]. Humalog - SS  Levemir 80 units AM & PM  Metformin:  500 mg BID    -Blood sugar trends: Downloaded Sindy CGM and plan to scan. TIR:  92%  High: 8%  Pt here today with her daughter Frannie Lozoya.   Pt states she recently was in the hospital for cellulitis and ulcer on toe. Household includes  and he usually does the cooking.    -Food recall:   Current diet: B: ~ 9 am Cherrios/ milk                                  1-1.5 cup/ 1 cup                           1-2 hr later - Glucerna carb smart granola bar/fruit                       L: 1230 - 1 pm - chicken dumplings                             Sausage sandwich- smoked and now using carb smart bread. Afternoon snack or before bed - string cheese stick                       D: 2 cup chili soup; occ with peanut butter with celery or bread/                       Snacks: apple/ nuts/ peanut butter. Last night -  mixed nuts and 1 small cookie and 1/2 cup milk. Beverages: coffee in am with cream, water. Occ milk at bedtime - helps her to sleep    Current exercise: 10-15 minutes per day                    Moving around the house                  Steps per day 1200 steps per day  Mobility - spinal stenosis, walking worse and walking is bad and thigh gives out. Cant do water aerobics now d/t Toe with ulcer.    -Impression of Dietary Intake: on average, 3 meals per day, low fiber, high fat/ cholesterol, lacking routine intake of fruits and vegetables    Current Outpatient Medications on File Prior to Visit   Medication Sig Dispense Refill    Cephalexin (KEFLEX PO) Take by mouth Just finished for cellulitis      NOVOFINE PEN NEEDLE 32G X 6 MM MISC USE 6 TIMES DAILY, AS DIRECTED. 100 each 3    ELIQUIS 5 MG TABS tablet Take 5 mg by mouth in the morning and at bedtime      Tirzepatide (MOUNJARO) 10 MG/0.5ML SOPN SC injection Inject 0.5 mLs into the skin once a week 4 Adjustable Dose Pre-filled Pen Syringe 0    mupirocin (BACTROBAN) 2 % ointment Apply topically daily Right big toe      escitalopram (LEXAPRO) 20 MG tablet Take 20 mg by mouth daily      insulin lispro, 1 Unit Dial, (HUMALOG/ADMELOG) 100 UNIT/ML SOPN Inject into the skin 3 times daily Inject with meals. Group 2 scale.       metFORMIN (GLUCOPHAGE) 500 MG tablet Take 500 mg by mouth 2 times daily (with meals)      melatonin 3 MG TABS tablet Take 3 mg by mouth nightly as needed      vitamin C (ASCORBIC ACID) 500 MG tablet Take 500 mg by mouth daily      amiodarone (CORDARONE) 200 MG tablet Take 200 mg by mouth daily      INVOKANA 300 MG TABS tablet Take 300 mg by mouth every morning (before breakfast)      lisinopril-hydroCHLOROthiazide (PRINZIDE;ZESTORETIC) 10-12.5 MG per tablet TAKE ONE TABLET, TWO TIMES A DAY, BY MOUTH. (Patient taking differently: Take 1 tablet by mouth in the morning and at bedtime) 180 tablet 0    CONTOUR NEXT TEST strip TEST BLOOD SUGAR, FOUR TIMES A DAY, AS DIRECTED. 100 strip 0    LEVEMIR FLEXTOUCH 100 UNIT/ML injection pen 80 units every morning and 80 units in evening      fluticasone (FLONASE) 50 MCG/ACT nasal spray 1 spray by Nasal route daily      metoprolol (LOPRESSOR) 100 MG tablet Take 1.5 tablets by mouth twice daily      levothyroxine (SYNTHROID) 100 MCG tablet Take 100 mcg by mouth Daily. calcium carbonate 600 MG TABS tablet Take 1 tablet by mouth in the morning and at bedtime      apixaban (ELIQUIS) 2.5 MG TABS tablet Take by mouth 2 times daily      Tirzepatide (MOUNJARO) 5 MG/0.5ML SOPN SC injection Inject 0.5 mLs into the skin once a week (Patient not taking: Reported on 1/3/2023) 4 Adjustable Dose Pre-filled Pen Syringe 0    Tirzepatide (MOUNJARO) 7.5 MG/0.5ML SOPN SC injection Inject 0.5 mLs into the skin once a week (Patient not taking: Reported on 1/3/2023) 4 Adjustable Dose Pre-filled Pen Syringe 0    Tirzepatide (MOUNJARO) 2.5 MG/0.5ML SOPN SC injection Inject 0.5 mLs into the skin once a week (Patient not taking: Reported on 1/3/2023) 1 Adjustable Dose Pre-filled Pen Syringe 5     No current facility-administered medications on file prior to visit.         Vitals from current and previous visits:  Ht 5' 5\" (1.651 m)   Wt (!) 309 lb (140.2 kg)   BMI 51.42 kg/m²     -Body mass index is 51.42 kg/m². Greater than 40 - Morbid Obesity / Extreme Obesity / Grade III. -Weight goal: lose weight. Nutrition Diagnosis:   Food and nutrition-related knowledge deficit related to Lack of previous MNT/currently undergoing MNT as evidenced by Conditions associated with a diagnosis or treatment: Type 2 DB and BMI 51. Intervention:  -Instructed the patient on: Carbohydrate Counting, Consistent Carbohydrate Intake, Food Label Reading, Meal Planning for Regular, Balanced Meals & Snacks, Plate Method, and The Importance of Regular Physical Activity    -Handouts given for: carbohydrate counting, food logging, healthy snacks, 150 gm carb sample meal plans/menus, and plate pics, hypoglycemia and how to treat a low BS, Harry coupon. Patient Instructions   1.)  Get familiar with reading the nutrition facts label by looking at serving size and total carbohydrates. - Without a label refer to your carb count guide booklet. 2.)  Measure foods for accuracy in carb counting.    3.)  Healthy carb choices:  whole grains, whole wheat pasta, starchy vegetables, fresh fruit and lowfat/non-fat milk and yogurt. 4.)  Your meal plan is found on the inside cover of your carb counting guide booklet:  1st meal or Brkf:  45 gms carbohydrates + 1-2 oz protein  2nd meal or Lunch: 45 gms carbohydrates + 2-3 oz protein + non-starchy veggies  3rd meal or Dinner:  45 gms carbohydrates + 2-3 oz protein + non- starchy veggies    Snack time:  15 - 20 gms carbohydrates + 1 oz protein x 1-2 snacks/day    5.)  Choose lean protein most of the time and Cut in 1/2 added fats to help with weight loss efforts. 6.) Bring a 1-2 week food log and Sindy reader to next dietitian appt. Thanks. Check BS:  4+/day  Fasting BS (1st thing in the morning) or before a meal (at least 4 hour since last ate), BS goal:  90 - 130  2 hours after the start of a meal, BS goal:  100 - 150     7.)  Keep doing your stretch bands for exercise.   - Also try other chair exercises. -Nutrition prescription: 1500 - 1600 calories/day, 150 - 180 g carbs/day. Adj wt. 173# (78 kg)    Comprehension verified using teachback method. Monitoring/Evaluation:   -Followup visit: 6 weeks with dietitian.   -Receptiveness to education/goals: Agreeable.  -Evaluation of education: Indicates understanding.  -Readiness to change: contemplation - ambivalent about change carb counting and balanced meal planning.  -Expected compliance: good. Thank you for your referral of this patient. Total time involved in direct patient education: 60 minutes for initial MNT visit.

## 2023-01-31 ENCOUNTER — TELEPHONE (OUTPATIENT)
Dept: FAMILY MEDICINE CLINIC | Age: 68
End: 2023-01-31

## 2023-01-31 ENCOUNTER — PATIENT MESSAGE (OUTPATIENT)
Dept: FAMILY MEDICINE CLINIC | Age: 68
End: 2023-01-31

## 2023-01-31 RX ORDER — INSULIN DETEMIR 100 [IU]/ML
65 INJECTION, SOLUTION SUBCUTANEOUS 2 TIMES DAILY
Qty: 3 EACH | Refills: 3 | Status: SHIPPED | OUTPATIENT
Start: 2023-01-31

## 2023-01-31 NOTE — TELEPHONE ENCOUNTER
Annie Tomlinson from [employer] called stating the [employer] are not available at this time, would like to know if can use the vials instead?

## 2023-01-31 NOTE — TELEPHONE ENCOUNTER
From: Alicia Hunter  To: Dr. Corbin Yu: 1/31/2023 2:49 PM EST  [facility]ice [facility]bour is on back order. i called last Friday already so this does not look good. I have enough for tonight and tomorrow morning and a partial dose tomorrow night. I have been taking only 65 units twice a day since my last hospital stay. My levels between 100 and 135 with few exceptions. My Singh Imam 2 had some issues. 2 of the sensors were bad and i had beeping to tell me my sugar was too low during the night. When I double checked with the finger stick it was 70-80 points off. I called the company and they are sending one. Anyway I have been without since Friday night. I started using my contour next to keep track. Now the \"poker\" will not work. The spring seems to be worn out! I no longer have that to rely on. ..back to square one! Could you prescribe another monitor and system so I have this as a back up? I had an issue with a bowel movement Sunday night. No constipation or diarrhea and did not strain. I do have hemorhoids that have not bothered me in years but they have always hurt when they bleed. Sunday night I had a lot of blood in the toilet and on the paper. It was like a heavy period amount. No pain. It has not happened since. Wondering of all the meds and antibiotics I've taken the last 6 months. .. And last, Dr. Juan A Robertson who did my toe suggested to Dr. Judith Nathan at [address] [facility] that I would be a good candidate for hyperbaric treatment. Your thoughts? Sorry to take so much of your time. Thank you!   Reg Mclain

## 2023-02-03 ENCOUNTER — PATIENT MESSAGE (OUTPATIENT)
Dept: FAMILY MEDICINE CLINIC | Age: 68
End: 2023-02-03

## 2023-02-03 NOTE — TELEPHONE ENCOUNTER
From: Clay Petersen  To: Dr. Hutchison Homes: 2/3/2023 3:07 PM EST  Subject: Toro Poor sensors    When I got my Toro Poor in late december i received 6 sensors with it. My refill is not till march 8,  I've had problems. First one I scraped on doorway and damaged it. Second fine. Third was removed tthe day after I put it on for an mrri. 4th was fine. 5 and 6 did not work from the time I put them on. I called the compay and they sent me one replacement. It came yesterday. I scraped on the doorway again and it quit working. Called [facility] and they can't help me. Said to call larger pharmacy. Called [employer] and was prepared to purchase if not too costly. They have them but only by presciption! They suggested calling my doctor for assistance, Can you help? Thank you.   Jackeline Daugherty

## 2023-02-03 NOTE — TELEPHONE ENCOUNTER
Dr. Tejal Christy sent in Insulin detemir (Levemir) 100 Unit/ML injection vial on 1/31/2023 to [address] in [place]!

## 2023-02-07 ENCOUNTER — PATIENT MESSAGE (OUTPATIENT)
Dept: FAMILY MEDICINE CLINIC | Age: 68
End: 2023-02-07

## 2023-02-07 NOTE — TELEPHONE ENCOUNTER
From: Leticia Lopez  To: Dr. Jarett Varela: 2/7/2023 4:25 PM EST  Subject: CGM    I called Medicare. I am not eligible for another monitor for 5 years. So Dexcom G6 is out for now. I explained about the Frankel 2 sensors. Thy will not tell me anything about what is covered. They will only talk to the the doctor who prescribes it or the pharmacy who provides it. [facility] they told me that I needed to contact you. They were unwilling to call anyone. I asked for the contact number to give you and was told you have the medicare provider number and they do not give it to patients. Would you be willing to call to see if they will pay for more Sindy 2 sensors? I guess it would be the [employer]. Unless they can be gotten through [employer]???? It only took 35 minutes and 3 representatives to get that info!!!   Thank you  Juanis Sweet

## 2023-03-13 ENCOUNTER — PATIENT MESSAGE (OUTPATIENT)
Dept: FAMILY MEDICINE CLINIC | Age: 68
End: 2023-03-13

## 2023-03-13 DIAGNOSIS — Z79.4 TYPE 2 DIABETES MELLITUS WITH STAGE 3A CHRONIC KIDNEY DISEASE, WITH LONG-TERM CURRENT USE OF INSULIN (HCC): Primary | ICD-10-CM

## 2023-03-13 DIAGNOSIS — E11.22 TYPE 2 DIABETES MELLITUS WITH STAGE 3A CHRONIC KIDNEY DISEASE, WITH LONG-TERM CURRENT USE OF INSULIN (HCC): Primary | ICD-10-CM

## 2023-03-13 DIAGNOSIS — N18.31 TYPE 2 DIABETES MELLITUS WITH STAGE 3A CHRONIC KIDNEY DISEASE, WITH LONG-TERM CURRENT USE OF INSULIN (HCC): Primary | ICD-10-CM

## 2023-03-13 NOTE — TELEPHONE ENCOUNTER
From: Tam Ran  To: Dr. Andres Dwyer: 3/13/2023 5:51 PM EDT  Subject: Shaun Tavares update    Hi Jorden  Over the last week my numbers have been in the upper 100's with an occasional 200 in the evening. I was wondering about starting some levimeer again. BUT, this morning I took my 4th pen of the 12.5 mounjaro. At 8:00 my sugar was 171. I ate a light breakfast before heading to [place]. At 10:00 it was 105. I had to eat crackers and drink OJ to get it high enough to have my oxygen treatment. I think it may be the mounjaro. I just wanted you to know how quickly and how much it dropped after the dose. I am scheduled to start the next highest dose next Monday. Do you think I need to make any changes to any of my meds or keep everything how it is? I wanted to update you  Thank you! Hope all is well!

## 2023-03-16 RX ORDER — PERPHENAZINE 16 MG/1
1 TABLET, FILM COATED ORAL
Qty: 150 STRIP | Refills: 3 | Status: SHIPPED | OUTPATIENT
Start: 2023-03-16

## 2023-04-26 ENCOUNTER — NURSE ONLY (OUTPATIENT)
Dept: LAB | Age: 68
End: 2023-04-26

## 2023-04-26 DIAGNOSIS — E11.22 TYPE 2 DIABETES MELLITUS WITH STAGE 3A CHRONIC KIDNEY DISEASE, WITH LONG-TERM CURRENT USE OF INSULIN (HCC): ICD-10-CM

## 2023-04-26 DIAGNOSIS — N18.31 TYPE 2 DIABETES MELLITUS WITH STAGE 3A CHRONIC KIDNEY DISEASE, WITH LONG-TERM CURRENT USE OF INSULIN (HCC): ICD-10-CM

## 2023-04-26 DIAGNOSIS — E78.2 MIXED HYPERLIPIDEMIA: ICD-10-CM

## 2023-04-26 DIAGNOSIS — Z79.4 TYPE 2 DIABETES MELLITUS WITH STAGE 3A CHRONIC KIDNEY DISEASE, WITH LONG-TERM CURRENT USE OF INSULIN (HCC): ICD-10-CM

## 2023-04-26 DIAGNOSIS — I10 PRIMARY HYPERTENSION: ICD-10-CM

## 2023-04-26 LAB
ALBUMIN SERPL BCG-MCNC: 4.4 G/DL (ref 3.5–5.1)
ALP SERPL-CCNC: 84 U/L (ref 38–126)
ALT SERPL W/O P-5'-P-CCNC: 15 U/L (ref 11–66)
ANION GAP SERPL CALC-SCNC: 17 MEQ/L (ref 8–16)
AST SERPL-CCNC: 20 U/L (ref 5–40)
BASOPHILS ABSOLUTE: 0.1 THOU/MM3 (ref 0–0.1)
BASOPHILS NFR BLD AUTO: 0.9 %
BILIRUB SERPL-MCNC: 0.4 MG/DL (ref 0.3–1.2)
BUN SERPL-MCNC: 37 MG/DL (ref 7–22)
CALCIUM SERPL-MCNC: 11 MG/DL (ref 8.5–10.5)
CHLORIDE SERPL-SCNC: 98 MEQ/L (ref 98–111)
CHOLESTEROL, FASTING: 181 MG/DL (ref 100–199)
CO2 SERPL-SCNC: 24 MEQ/L (ref 23–33)
CREAT SERPL-MCNC: 1.4 MG/DL (ref 0.4–1.2)
CREAT UR-MCNC: 102.5 MG/DL
DEPRECATED RDW RBC AUTO: 54.9 FL (ref 35–45)
EOSINOPHIL NFR BLD AUTO: 3.8 %
EOSINOPHILS ABSOLUTE: 0.3 THOU/MM3 (ref 0–0.4)
ERYTHROCYTE [DISTWIDTH] IN BLOOD BY AUTOMATED COUNT: 16.1 % (ref 11.5–14.5)
GFR SERPL CREATININE-BSD FRML MDRD: 40 ML/MIN/1.73M2
GLUCOSE FASTING: 148 MG/DL (ref 70–108)
HCT VFR BLD AUTO: 44.7 % (ref 37–47)
HDLC SERPL-MCNC: 52 MG/DL
HGB BLD-MCNC: 14 GM/DL (ref 12–16)
IMM GRANULOCYTES # BLD AUTO: 0.07 THOU/MM3 (ref 0–0.07)
IMM GRANULOCYTES NFR BLD AUTO: 0.9 %
LDLC SERPL CALC-MCNC: 96 MG/DL
LYMPHOCYTES ABSOLUTE: 2.1 THOU/MM3 (ref 1–4.8)
LYMPHOCYTES NFR BLD AUTO: 27.6 %
MCH RBC QN AUTO: 28.9 PG (ref 26–33)
MCHC RBC AUTO-ENTMCNC: 31.3 GM/DL (ref 32.2–35.5)
MCV RBC AUTO: 92.4 FL (ref 81–99)
MICROALBUMIN UR-MCNC: < 1.2 MG/DL
MICROALBUMIN/CREAT RATIO PNL UR: 12 MG/G (ref 0–30)
MONOCYTES ABSOLUTE: 0.5 THOU/MM3 (ref 0.4–1.3)
MONOCYTES NFR BLD AUTO: 6.3 %
NEUTROPHILS NFR BLD AUTO: 60.5 %
NRBC BLD AUTO-RTO: 0 /100 WBC
PLATELET # BLD AUTO: 296 THOU/MM3 (ref 130–400)
PMV BLD AUTO: 10.3 FL (ref 9.4–12.4)
POTASSIUM SERPL-SCNC: 4.9 MEQ/L (ref 3.5–5.2)
PROT SERPL-MCNC: 7.3 G/DL (ref 6.1–8)
RBC # BLD AUTO: 4.84 MILL/MM3 (ref 4.2–5.4)
SEGMENTED NEUTROPHILS ABSOLUTE COUNT: 4.7 THOU/MM3 (ref 1.8–7.7)
SODIUM SERPL-SCNC: 139 MEQ/L (ref 135–145)
TRIGLYCERIDE, FASTING: 164 MG/DL (ref 0–199)
TSH SERPL DL<=0.005 MIU/L-ACNC: 3.88 UIU/ML (ref 0.4–4.2)
WBC # BLD AUTO: 7.7 THOU/MM3 (ref 4.8–10.8)

## 2023-04-27 ENCOUNTER — OFFICE VISIT (OUTPATIENT)
Dept: FAMILY MEDICINE CLINIC | Age: 68
End: 2023-04-27
Payer: MEDICARE

## 2023-04-27 VITALS
BODY MASS INDEX: 48.82 KG/M2 | DIASTOLIC BLOOD PRESSURE: 70 MMHG | HEIGHT: 65 IN | OXYGEN SATURATION: 98 % | WEIGHT: 293 LBS | HEART RATE: 74 BPM | SYSTOLIC BLOOD PRESSURE: 122 MMHG

## 2023-04-27 DIAGNOSIS — Z00.00 MEDICARE ANNUAL WELLNESS VISIT, SUBSEQUENT: Primary | ICD-10-CM

## 2023-04-27 DIAGNOSIS — E11.22 TYPE 2 DIABETES MELLITUS WITH STAGE 3A CHRONIC KIDNEY DISEASE, WITH LONG-TERM CURRENT USE OF INSULIN (HCC): ICD-10-CM

## 2023-04-27 DIAGNOSIS — N18.31 TYPE 2 DIABETES MELLITUS WITH STAGE 3A CHRONIC KIDNEY DISEASE, WITH LONG-TERM CURRENT USE OF INSULIN (HCC): ICD-10-CM

## 2023-04-27 DIAGNOSIS — Z79.4 TYPE 2 DIABETES MELLITUS WITH STAGE 3A CHRONIC KIDNEY DISEASE, WITH LONG-TERM CURRENT USE OF INSULIN (HCC): ICD-10-CM

## 2023-04-27 DIAGNOSIS — I87.2 CHRONIC VENOUS INSUFFICIENCY: ICD-10-CM

## 2023-04-27 DIAGNOSIS — I10 PRIMARY HYPERTENSION: ICD-10-CM

## 2023-04-27 DIAGNOSIS — E78.2 MIXED HYPERLIPIDEMIA: ICD-10-CM

## 2023-04-27 DIAGNOSIS — F32.A DEPRESSIVE DISORDER: ICD-10-CM

## 2023-04-27 DIAGNOSIS — I48.0 PAROXYSMAL ATRIAL FIBRILLATION (HCC): ICD-10-CM

## 2023-04-27 PROBLEM — E11.9 TYPE 2 DIABETES MELLITUS (HCC): Status: RESOLVED | Noted: 2022-07-27 | Resolved: 2023-04-27

## 2023-04-27 LAB
DEPRECATED MEAN GLUCOSE BLD GHB EST-ACNC: 147 MG/DL (ref 70–126)
HBA1C MFR BLD HPLC: 6.9 % (ref 4.4–6.4)
VIT B12 SERPL-MCNC: 1509 PG/ML (ref 211–911)

## 2023-04-27 PROCEDURE — 3074F SYST BP LT 130 MM HG: CPT | Performed by: FAMILY MEDICINE

## 2023-04-27 PROCEDURE — 1123F ACP DISCUSS/DSCN MKR DOCD: CPT | Performed by: FAMILY MEDICINE

## 2023-04-27 PROCEDURE — 3044F HG A1C LEVEL LT 7.0%: CPT | Performed by: FAMILY MEDICINE

## 2023-04-27 PROCEDURE — G0439 PPPS, SUBSEQ VISIT: HCPCS | Performed by: FAMILY MEDICINE

## 2023-04-27 PROCEDURE — 3078F DIAST BP <80 MM HG: CPT | Performed by: FAMILY MEDICINE

## 2023-04-27 PROCEDURE — 3017F COLORECTAL CA SCREEN DOC REV: CPT | Performed by: FAMILY MEDICINE

## 2023-04-27 SDOH — ECONOMIC STABILITY: FOOD INSECURITY: WITHIN THE PAST 12 MONTHS, THE FOOD YOU BOUGHT JUST DIDN'T LAST AND YOU DIDN'T HAVE MONEY TO GET MORE.: NEVER TRUE

## 2023-04-27 SDOH — ECONOMIC STABILITY: HOUSING INSECURITY
IN THE LAST 12 MONTHS, WAS THERE A TIME WHEN YOU DID NOT HAVE A STEADY PLACE TO SLEEP OR SLEPT IN A SHELTER (INCLUDING NOW)?: NO

## 2023-04-27 SDOH — ECONOMIC STABILITY: INCOME INSECURITY: HOW HARD IS IT FOR YOU TO PAY FOR THE VERY BASICS LIKE FOOD, HOUSING, MEDICAL CARE, AND HEATING?: NOT HARD AT ALL

## 2023-04-27 SDOH — ECONOMIC STABILITY: FOOD INSECURITY: WITHIN THE PAST 12 MONTHS, YOU WORRIED THAT YOUR FOOD WOULD RUN OUT BEFORE YOU GOT MONEY TO BUY MORE.: NEVER TRUE

## 2023-04-27 ASSESSMENT — PATIENT HEALTH QUESTIONNAIRE - PHQ9
2. FEELING DOWN, DEPRESSED OR HOPELESS: 0
SUM OF ALL RESPONSES TO PHQ9 QUESTIONS 1 & 2: 0
8. MOVING OR SPEAKING SO SLOWLY THAT OTHER PEOPLE COULD HAVE NOTICED. OR THE OPPOSITE, BEING SO FIGETY OR RESTLESS THAT YOU HAVE BEEN MOVING AROUND A LOT MORE THAN USUAL: 0
3. TROUBLE FALLING OR STAYING ASLEEP: 0
SUM OF ALL RESPONSES TO PHQ QUESTIONS 1-9: 0
SUM OF ALL RESPONSES TO PHQ QUESTIONS 1-9: 0
7. TROUBLE CONCENTRATING ON THINGS, SUCH AS READING THE NEWSPAPER OR WATCHING TELEVISION: 0
1. LITTLE INTEREST OR PLEASURE IN DOING THINGS: 0
4. FEELING TIRED OR HAVING LITTLE ENERGY: 0
5. POOR APPETITE OR OVEREATING: 0
SUM OF ALL RESPONSES TO PHQ QUESTIONS 1-9: 0
6. FEELING BAD ABOUT YOURSELF - OR THAT YOU ARE A FAILURE OR HAVE LET YOURSELF OR YOUR FAMILY DOWN: 0
10. IF YOU CHECKED OFF ANY PROBLEMS, HOW DIFFICULT HAVE THESE PROBLEMS MADE IT FOR YOU TO DO YOUR WORK, TAKE CARE OF THINGS AT HOME, OR GET ALONG WITH OTHER PEOPLE: 0
SUM OF ALL RESPONSES TO PHQ QUESTIONS 1-9: 0
9. THOUGHTS THAT YOU WOULD BE BETTER OFF DEAD, OR OF HURTING YOURSELF: 0

## 2023-04-27 ASSESSMENT — LIFESTYLE VARIABLES
HOW MANY STANDARD DRINKS CONTAINING ALCOHOL DO YOU HAVE ON A TYPICAL DAY: 1 OR 2
HOW OFTEN DO YOU HAVE A DRINK CONTAINING ALCOHOL: MONTHLY OR LESS

## 2023-04-27 ASSESSMENT — ENCOUNTER SYMPTOMS
SHORTNESS OF BREATH: 0
DIARRHEA: 0
TROUBLE SWALLOWING: 0
VOMITING: 0
COUGH: 0
CONSTIPATION: 0
SORE THROAT: 0
ABDOMINAL PAIN: 0

## 2023-05-15 RX ORDER — LISINOPRIL AND HYDROCHLOROTHIAZIDE 12.5; 1 MG/1; MG/1
TABLET ORAL
Qty: 180 TABLET | Refills: 0 | Status: SHIPPED | OUTPATIENT
Start: 2023-05-15

## 2023-05-15 RX ORDER — CANAGLIFLOZIN 300 MG/1
TABLET, FILM COATED ORAL
Qty: 30 TABLET | Refills: 0 | Status: SHIPPED | OUTPATIENT
Start: 2023-05-15

## 2023-07-11 ENCOUNTER — TELEPHONE (OUTPATIENT)
Dept: FAMILY MEDICINE CLINIC | Age: 68
End: 2023-07-11

## 2023-07-11 RX ORDER — CANAGLIFLOZIN 300 MG/1
TABLET, FILM COATED ORAL
Qty: 30 TABLET | Refills: 0 | Status: SHIPPED | OUTPATIENT
Start: 2023-07-11

## 2023-07-11 NOTE — TELEPHONE ENCOUNTER
Discussed case with Yusuf Calloway from [employer]. Okay for Mounjaro 12.5 mg refill x1 month, until 15 mg dosage becomes available. Rx for Invokana sent electronically.   ES

## 2023-07-11 NOTE — TELEPHONE ENCOUNTER
Valentine Roberson from [facility] called stating they are having difficulties getting the 15 MG Mounjaro and will not have it available for at least a few weeks. Valentine Roberson wants to k now if it would be okay to give the patient 12.5 MG as they have that available now. Valentine Roberson also stated the patient is due for a refill on the Invokana 300 MG Tablet  Take 1 tablet by mouth daily.

## 2023-07-26 ENCOUNTER — NURSE ONLY (OUTPATIENT)
Dept: LAB | Age: 68
End: 2023-07-26

## 2023-07-26 DIAGNOSIS — E11.22 TYPE 2 DIABETES MELLITUS WITH STAGE 3A CHRONIC KIDNEY DISEASE, WITH LONG-TERM CURRENT USE OF INSULIN (HCC): ICD-10-CM

## 2023-07-26 DIAGNOSIS — Z79.4 TYPE 2 DIABETES MELLITUS WITH STAGE 3A CHRONIC KIDNEY DISEASE, WITH LONG-TERM CURRENT USE OF INSULIN (HCC): ICD-10-CM

## 2023-07-26 DIAGNOSIS — N18.31 TYPE 2 DIABETES MELLITUS WITH STAGE 3A CHRONIC KIDNEY DISEASE, WITH LONG-TERM CURRENT USE OF INSULIN (HCC): ICD-10-CM

## 2023-07-26 LAB
ALBUMIN SERPL BCG-MCNC: 3.8 G/DL (ref 3.5–5.1)
ALP SERPL-CCNC: 67 U/L (ref 38–126)
ALT SERPL W/O P-5'-P-CCNC: 17 U/L (ref 11–66)
ANION GAP SERPL CALC-SCNC: 13 MEQ/L (ref 8–16)
AST SERPL-CCNC: 22 U/L (ref 5–40)
BILIRUB SERPL-MCNC: 0.4 MG/DL (ref 0.3–1.2)
BUN SERPL-MCNC: 37 MG/DL (ref 7–22)
CALCIUM SERPL-MCNC: 9.8 MG/DL (ref 8.5–10.5)
CHLORIDE SERPL-SCNC: 97 MEQ/L (ref 98–111)
CO2 SERPL-SCNC: 24 MEQ/L (ref 23–33)
CREAT SERPL-MCNC: 1.5 MG/DL (ref 0.4–1.2)
GFR SERPL CREATININE-BSD FRML MDRD: 37 ML/MIN/1.73M2
GLUCOSE FASTING: 177 MG/DL (ref 70–108)
POTASSIUM SERPL-SCNC: 4 MEQ/L (ref 3.5–5.2)
PROT SERPL-MCNC: 7 G/DL (ref 6.1–8)
SODIUM SERPL-SCNC: 134 MEQ/L (ref 135–145)

## 2023-07-27 ENCOUNTER — OFFICE VISIT (OUTPATIENT)
Dept: FAMILY MEDICINE CLINIC | Age: 68
End: 2023-07-27
Payer: MEDICARE

## 2023-07-27 VITALS
BODY MASS INDEX: 48.45 KG/M2 | DIASTOLIC BLOOD PRESSURE: 72 MMHG | HEIGHT: 65 IN | SYSTOLIC BLOOD PRESSURE: 126 MMHG | OXYGEN SATURATION: 97 % | WEIGHT: 290.8 LBS | HEART RATE: 74 BPM

## 2023-07-27 DIAGNOSIS — E78.2 MIXED HYPERLIPIDEMIA: ICD-10-CM

## 2023-07-27 DIAGNOSIS — F32.A DEPRESSIVE DISORDER: ICD-10-CM

## 2023-07-27 DIAGNOSIS — E11.22 TYPE 2 DIABETES MELLITUS WITH STAGE 3A CHRONIC KIDNEY DISEASE, WITH LONG-TERM CURRENT USE OF INSULIN (HCC): Primary | ICD-10-CM

## 2023-07-27 DIAGNOSIS — N18.31 TYPE 2 DIABETES MELLITUS WITH STAGE 3A CHRONIC KIDNEY DISEASE, WITH LONG-TERM CURRENT USE OF INSULIN (HCC): Primary | ICD-10-CM

## 2023-07-27 DIAGNOSIS — I10 PRIMARY HYPERTENSION: ICD-10-CM

## 2023-07-27 DIAGNOSIS — Z79.4 TYPE 2 DIABETES MELLITUS WITH STAGE 3A CHRONIC KIDNEY DISEASE, WITH LONG-TERM CURRENT USE OF INSULIN (HCC): Primary | ICD-10-CM

## 2023-07-27 DIAGNOSIS — I48.0 PAROXYSMAL ATRIAL FIBRILLATION (HCC): ICD-10-CM

## 2023-07-27 DIAGNOSIS — N28.9 RENAL INSUFFICIENCY: ICD-10-CM

## 2023-07-27 LAB
DEPRECATED MEAN GLUCOSE BLD GHB EST-ACNC: 159 MG/DL (ref 70–126)
HBA1C MFR BLD HPLC: 7.3 % (ref 4.4–6.4)

## 2023-07-27 PROCEDURE — G8427 DOCREV CUR MEDS BY ELIG CLIN: HCPCS | Performed by: FAMILY MEDICINE

## 2023-07-27 PROCEDURE — 1036F TOBACCO NON-USER: CPT | Performed by: FAMILY MEDICINE

## 2023-07-27 PROCEDURE — 2022F DILAT RTA XM EVC RTNOPTHY: CPT | Performed by: FAMILY MEDICINE

## 2023-07-27 PROCEDURE — 3074F SYST BP LT 130 MM HG: CPT | Performed by: FAMILY MEDICINE

## 2023-07-27 PROCEDURE — 3051F HG A1C>EQUAL 7.0%<8.0%: CPT | Performed by: FAMILY MEDICINE

## 2023-07-27 PROCEDURE — 1123F ACP DISCUSS/DSCN MKR DOCD: CPT | Performed by: FAMILY MEDICINE

## 2023-07-27 PROCEDURE — 3017F COLORECTAL CA SCREEN DOC REV: CPT | Performed by: FAMILY MEDICINE

## 2023-07-27 PROCEDURE — G8417 CALC BMI ABV UP PARAM F/U: HCPCS | Performed by: FAMILY MEDICINE

## 2023-07-27 PROCEDURE — 99214 OFFICE O/P EST MOD 30 MIN: CPT | Performed by: FAMILY MEDICINE

## 2023-07-27 PROCEDURE — G8400 PT W/DXA NO RESULTS DOC: HCPCS | Performed by: FAMILY MEDICINE

## 2023-07-27 PROCEDURE — 1090F PRES/ABSN URINE INCON ASSESS: CPT | Performed by: FAMILY MEDICINE

## 2023-07-27 PROCEDURE — 3078F DIAST BP <80 MM HG: CPT | Performed by: FAMILY MEDICINE

## 2023-07-27 RX ORDER — TIRZEPATIDE 12.5 MG/.5ML
INJECTION, SOLUTION SUBCUTANEOUS
COMMUNITY
Start: 2023-07-11

## 2023-07-27 ASSESSMENT — ENCOUNTER SYMPTOMS
SORE THROAT: 0
ABDOMINAL PAIN: 0
CONSTIPATION: 0
VOMITING: 0
TROUBLE SWALLOWING: 0
COUGH: 0
SHORTNESS OF BREATH: 0
DIARRHEA: 0

## 2023-07-27 NOTE — PROGRESS NOTES
[address] [address]  Dept: [phone]     SINTIA Mo is a 77 y. o.female    Pt presents for follow up medications. Pt feeling ok since last visit- interval history and any new issues noted below:       Patient Active Problem List   Diagnosis    COVID-19    Atrial fibrillation (HCC)    Chronic venous insufficiency    Depressive disorder    Hyperlipidemia    Obesity    Obstructive sleep apnea syndrome    Spinal stenosis of lumbar region    Body mass index (BMI) 50.0-59.9, adult (HCC)       Current Outpatient Medications   Medication Sig Dispense Refill    MOUNJARO 12.5 MG/0.5ML SOPN SC injection       INVOKANA 300 MG TABS tablet TAKE ONE TABLET DAILY, BY MOUTH. 30 tablet 0    lisinopril-hydroCHLOROthiazide (PRINZIDE;ZESTORETIC) 10-12.5 MG per tablet TAKE ONE TABLET, TWO TIMES A DAY, BY MOUTH. 180 tablet 0    blood glucose test strips (CONTOUR NEXT TEST) strip Inject 1 each into the skin 5 times daily As needed. 150 strip 3    metoprolol (LOPRESSOR) 100 MG tablet TAKE 1 & (1/2) TABLETS, TWO TIMES A DAY, BY MOUTH. 270 tablet 3    levothyroxine (SYNTHROID) 100 MCG tablet TAKE ONE TABLET DAILY, BY MOUTH. 90 tablet 3    NOVOFINE PEN NEEDLE 32G X 6 MM MISC USE 6 TIMES DAILY, AS DIRECTED. 100 each 3    ELIQUIS 5 MG TABS tablet Take 1 tablet by mouth in the morning and at bedtime      escitalopram (LEXAPRO) 20 MG tablet Take 1 tablet by mouth daily      insulin lispro, 1 Unit Dial, (HUMALOG/ADMELOG) 100 UNIT/ML SOPN Inject into the skin 3 times daily Inject with meals. Group 2 scale.       metFORMIN (GLUCOPHAGE) 500 MG tablet Take 1 tablet by mouth 2 times daily (with meals)      melatonin 3 MG TABS tablet Take 1 tablet by mouth nightly as needed      vitamin C (ASCORBIC ACID) 500 MG tablet Take 1 tablet by mouth daily      amiodarone (CORDARONE) 200 MG tablet Take 1 tablet by mouth daily      fluticasone (FLONASE) 50 MCG/ACT

## 2023-09-08 RX ORDER — CANAGLIFLOZIN 300 MG/1
TABLET, FILM COATED ORAL
Qty: 30 TABLET | Refills: 0 | Status: SHIPPED | OUTPATIENT
Start: 2023-09-08

## 2023-09-08 RX ORDER — ESCITALOPRAM OXALATE 20 MG/1
TABLET ORAL
Qty: 90 TABLET | Refills: 0 | Status: SHIPPED | OUTPATIENT
Start: 2023-09-08

## 2023-09-13 ENCOUNTER — COMMUNITY OUTREACH (OUTPATIENT)
Dept: FAMILY MEDICINE CLINIC | Age: 68
End: 2023-09-13

## 2023-09-17 ENCOUNTER — PATIENT MESSAGE (OUTPATIENT)
Dept: FAMILY MEDICINE CLINIC | Age: 68
End: 2023-09-17

## 2023-09-18 ENCOUNTER — OFFICE VISIT (OUTPATIENT)
Dept: FAMILY MEDICINE CLINIC | Age: 68
End: 2023-09-18
Payer: MEDICARE

## 2023-09-18 VITALS
BODY MASS INDEX: 48.08 KG/M2 | HEIGHT: 65 IN | RESPIRATION RATE: 18 BRPM | OXYGEN SATURATION: 97 % | HEART RATE: 65 BPM | DIASTOLIC BLOOD PRESSURE: 62 MMHG | WEIGHT: 288.6 LBS | SYSTOLIC BLOOD PRESSURE: 98 MMHG

## 2023-09-18 DIAGNOSIS — R05.1 ACUTE COUGH: ICD-10-CM

## 2023-09-18 DIAGNOSIS — U07.1 COVID-19 VIRUS INFECTION: Primary | ICD-10-CM

## 2023-09-18 LAB
INFLUENZA VIRUS A RNA: NEGATIVE
INFLUENZA VIRUS B RNA: NEGATIVE
Lab: ABNORMAL
QC PASS/FAIL: ABNORMAL
SARS-COV-2 RDRP RESP QL NAA+PROBE: POSITIVE

## 2023-09-18 PROCEDURE — 1036F TOBACCO NON-USER: CPT | Performed by: STUDENT IN AN ORGANIZED HEALTH CARE EDUCATION/TRAINING PROGRAM

## 2023-09-18 PROCEDURE — 99213 OFFICE O/P EST LOW 20 MIN: CPT | Performed by: STUDENT IN AN ORGANIZED HEALTH CARE EDUCATION/TRAINING PROGRAM

## 2023-09-18 PROCEDURE — G8427 DOCREV CUR MEDS BY ELIG CLIN: HCPCS | Performed by: STUDENT IN AN ORGANIZED HEALTH CARE EDUCATION/TRAINING PROGRAM

## 2023-09-18 PROCEDURE — 1123F ACP DISCUSS/DSCN MKR DOCD: CPT | Performed by: STUDENT IN AN ORGANIZED HEALTH CARE EDUCATION/TRAINING PROGRAM

## 2023-09-18 PROCEDURE — 87502 INFLUENZA DNA AMP PROBE: CPT | Performed by: STUDENT IN AN ORGANIZED HEALTH CARE EDUCATION/TRAINING PROGRAM

## 2023-09-18 PROCEDURE — G8417 CALC BMI ABV UP PARAM F/U: HCPCS | Performed by: STUDENT IN AN ORGANIZED HEALTH CARE EDUCATION/TRAINING PROGRAM

## 2023-09-18 PROCEDURE — 1090F PRES/ABSN URINE INCON ASSESS: CPT | Performed by: STUDENT IN AN ORGANIZED HEALTH CARE EDUCATION/TRAINING PROGRAM

## 2023-09-18 PROCEDURE — 3017F COLORECTAL CA SCREEN DOC REV: CPT | Performed by: STUDENT IN AN ORGANIZED HEALTH CARE EDUCATION/TRAINING PROGRAM

## 2023-09-18 PROCEDURE — G8400 PT W/DXA NO RESULTS DOC: HCPCS | Performed by: STUDENT IN AN ORGANIZED HEALTH CARE EDUCATION/TRAINING PROGRAM

## 2023-09-18 PROCEDURE — 87635 SARS-COV-2 COVID-19 AMP PRB: CPT | Performed by: STUDENT IN AN ORGANIZED HEALTH CARE EDUCATION/TRAINING PROGRAM

## 2023-09-18 RX ORDER — NIRMATRELVIR AND RITONAVIR 150-100 MG
KIT ORAL
Qty: 20 TABLET | Refills: 0 | Status: SHIPPED | OUTPATIENT
Start: 2023-09-18 | End: 2023-09-23

## 2023-09-18 ASSESSMENT — ENCOUNTER SYMPTOMS
SINUS PRESSURE: 1
COUGH: 1
RHINORRHEA: 1
WHEEZING: 0
VOMITING: 0
ABDOMINAL PAIN: 0
SHORTNESS OF BREATH: 0
DIARRHEA: 1
NAUSEA: 0
SORE THROAT: 1

## 2023-09-18 NOTE — TELEPHONE ENCOUNTER
From: Yuliet Anderson  To: Dr. Felton [place]: 9/17/2023 1:30 PM EDT  Subject: update    My cough is much worse today. Im bringing up thick phlegm the size of a dime. Still no fever.  Sore throat and slight headache today,

## 2023-09-18 NOTE — TELEPHONE ENCOUNTER
Please contact patient and see if she would be willing to schedule a same day appointment in our [facility] office today with Dr. Nena De La Rosa.

## 2023-09-18 NOTE — PROGRESS NOTES
for complications due to her comorbidities, so I did discuss Paxlovid therapy with her. Patient will need to contact her cardiologist to discuss if she is able to take this with her Eliquis (or find out if her Eliquis dose can be adjusted) prior to starting this medication. Patient did prefer that I send into the pharmacy for her to  to start taking in case cardiology gives her the OK to proceed. She was advised to monitor her symptoms closely and return for reevaluation if symptoms worsen or persist.  Strict ER precautions reinforced. She does have a blood pressure cuff and pulse ox reader at home to check her vitals regularly. She was instructed to hold her nighttime dose of BP medication tonight if her systolic blood pressure remains less than 100. I will plan to keep her PCP updated. Return if symptoms worsen or fail to improve.     Electronically signed by Esther Oconnor DO on 9/18/2023 at 2:26 PM

## 2023-09-19 ENCOUNTER — TELEPHONE (OUTPATIENT)
Dept: FAMILY MEDICINE CLINIC | Age: 68
End: 2023-09-19

## 2023-09-19 NOTE — TELEPHONE ENCOUNTER
Patient stated she has started the medication. She stated her cardiologist stopped her on eliquis for 5 days.

## 2023-09-19 NOTE — TELEPHONE ENCOUNTER
----- Message from Randall Persaud DO sent at 9/19/2023  7:05 AM EDT -----  Please reach out to patient and find out if she was able to speak to her cardiologist about starting Paxlovid? She should not start the medicine until she clears it with cardiology. Let me know.  Thanks    Dr. Siddhartha Jefferson

## 2023-09-20 ENCOUNTER — PATIENT MESSAGE (OUTPATIENT)
Dept: FAMILY MEDICINE CLINIC | Age: 68
End: 2023-09-20

## 2023-09-20 NOTE — TELEPHONE ENCOUNTER
From: Ramón   To: Dr. Karla Perez: 9/20/2023 4:53 PM EDT  Subject: update    Would it be Ok for me to take mucinex or something similar to relieve the sinus pressure?  I am assuming a Z dereck or other antibiotic would not help???? headache and lots of pressure

## 2023-09-29 ENCOUNTER — PATIENT MESSAGE (OUTPATIENT)
Dept: FAMILY MEDICINE CLINIC | Age: 68
End: 2023-09-29

## 2023-10-02 NOTE — TELEPHONE ENCOUNTER
From: Kee Mcclellan  To: Dr. Hollins Other: 9/29/2023 11:11 AM EDT  Subject: Dario Quintero! [employer] is asking for updated chart notes in order to renew my sensor prescription. This is not an easy company to deal with. I ordered 3 weeks ago. Ii called Wednesday because I needed them yesterday. They had made a mistake on the insurance number and promised they would go out that day. None yet today so I am out. I called again and they now need the chart notes. There was no notification with either issue. Just no sensors! The fax number is [phone]. I would appreciate if you could get the notes to them asa. Thank youi.

## 2023-10-02 NOTE — TELEPHONE ENCOUNTER
I faxed Dr. Tisha Morrell office note dated 07- to Ivon Parr at [phone] and received a fax confirmation back.

## 2023-10-09 RX ORDER — CANAGLIFLOZIN 300 MG/1
TABLET, FILM COATED ORAL
Qty: 90 TABLET | Refills: 3 | Status: SHIPPED | OUTPATIENT
Start: 2023-10-09

## 2023-10-12 ENCOUNTER — OFFICE VISIT (OUTPATIENT)
Dept: FAMILY MEDICINE CLINIC | Age: 68
End: 2023-10-12
Payer: MEDICARE

## 2023-10-12 VITALS
WEIGHT: 285.6 LBS | BODY MASS INDEX: 47.58 KG/M2 | SYSTOLIC BLOOD PRESSURE: 136 MMHG | DIASTOLIC BLOOD PRESSURE: 80 MMHG | HEART RATE: 68 BPM | HEIGHT: 65 IN | OXYGEN SATURATION: 95 %

## 2023-10-12 DIAGNOSIS — Z79.4 TYPE 2 DIABETES MELLITUS WITH STAGE 3A CHRONIC KIDNEY DISEASE, WITH LONG-TERM CURRENT USE OF INSULIN (HCC): Primary | ICD-10-CM

## 2023-10-12 DIAGNOSIS — N18.31 TYPE 2 DIABETES MELLITUS WITH STAGE 3A CHRONIC KIDNEY DISEASE, WITH LONG-TERM CURRENT USE OF INSULIN (HCC): Primary | ICD-10-CM

## 2023-10-12 DIAGNOSIS — I48.0 PAROXYSMAL ATRIAL FIBRILLATION (HCC): ICD-10-CM

## 2023-10-12 DIAGNOSIS — E78.2 MIXED HYPERLIPIDEMIA: ICD-10-CM

## 2023-10-12 DIAGNOSIS — I10 PRIMARY HYPERTENSION: ICD-10-CM

## 2023-10-12 DIAGNOSIS — E11.22 TYPE 2 DIABETES MELLITUS WITH STAGE 3A CHRONIC KIDNEY DISEASE, WITH LONG-TERM CURRENT USE OF INSULIN (HCC): Primary | ICD-10-CM

## 2023-10-12 PROCEDURE — 3017F COLORECTAL CA SCREEN DOC REV: CPT | Performed by: FAMILY MEDICINE

## 2023-10-12 PROCEDURE — 3051F HG A1C>EQUAL 7.0%<8.0%: CPT | Performed by: FAMILY MEDICINE

## 2023-10-12 PROCEDURE — G8417 CALC BMI ABV UP PARAM F/U: HCPCS | Performed by: FAMILY MEDICINE

## 2023-10-12 PROCEDURE — 3075F SYST BP GE 130 - 139MM HG: CPT | Performed by: FAMILY MEDICINE

## 2023-10-12 PROCEDURE — G8400 PT W/DXA NO RESULTS DOC: HCPCS | Performed by: FAMILY MEDICINE

## 2023-10-12 PROCEDURE — 3079F DIAST BP 80-89 MM HG: CPT | Performed by: FAMILY MEDICINE

## 2023-10-12 PROCEDURE — G8484 FLU IMMUNIZE NO ADMIN: HCPCS | Performed by: FAMILY MEDICINE

## 2023-10-12 PROCEDURE — 1090F PRES/ABSN URINE INCON ASSESS: CPT | Performed by: FAMILY MEDICINE

## 2023-10-12 PROCEDURE — 2022F DILAT RTA XM EVC RTNOPTHY: CPT | Performed by: FAMILY MEDICINE

## 2023-10-12 PROCEDURE — 1123F ACP DISCUSS/DSCN MKR DOCD: CPT | Performed by: FAMILY MEDICINE

## 2023-10-12 PROCEDURE — 1036F TOBACCO NON-USER: CPT | Performed by: FAMILY MEDICINE

## 2023-10-12 PROCEDURE — 99214 OFFICE O/P EST MOD 30 MIN: CPT | Performed by: FAMILY MEDICINE

## 2023-10-12 PROCEDURE — G8427 DOCREV CUR MEDS BY ELIG CLIN: HCPCS | Performed by: FAMILY MEDICINE

## 2023-10-12 ASSESSMENT — ENCOUNTER SYMPTOMS
SHORTNESS OF BREATH: 0
COUGH: 0
VOMITING: 0
DIARRHEA: 0
CONSTIPATION: 0
NAUSEA: 0

## 2023-10-12 NOTE — PROGRESS NOTES
[address] [place] [id number] [phone]    Isiah Mo (:  1955) is a 77 y.o. female, here for evaluation of the following chief complaint(s):  Diabetes (Pt here to get paperwork done for new diabetic shoes. Pt is going to be using a place in [facility] for the shoes. Pt also has c/o lower back/hip pain. States that pain comes as a sharp pain while standing up or twisting a certain way. This started Ismael 10/8.)           ASSESSMENT/PLAN:  1. Type 2 diabetes mellitus with stage 3a chronic kidney disease, with long-term current use of insulin ([address])  A1c's have been improving since current treatment patient to get her laboratories done later this week  Diabetic foot exam performed today which shows her peripheral neuropathy with a prior partial amputation and lots of callus formation no ulcerations at this time  - Comprehensive Metabolic Panel, Fasting; Future  - Hemoglobin A1C; Future    2. Primary hypertension  Blood pressure perfectly controlled no change  - Comprehensive Metabolic Panel, Fasting; Future    3. Paroxysmal atrial fibrillation (HCC)  Patient in rhythm today continue follow-up with cardiology    4. Mixed hyperlipidemia  Tolerating meds well no change      Follow-up 3 months    There are no Patient Instructions on file for this visit. Subjective   SUBJECTIVE/OBJECTIVE:  HPI patient here for chronic follow-up today overall has been doing fairly well except that she is extremely limited in her activities due to the boot that she has to wear on her right foot she needs to wear that boot until next July. Continues to have appetite suppression and weight loss on her Mounjaro. Review of Systems   Constitutional:  Negative for activity change and fever. HENT:  Negative for congestion and ear pain. Respiratory:  Negative for cough and shortness of breath.     Cardiovascular:

## 2023-10-16 ENCOUNTER — NURSE ONLY (OUTPATIENT)
Dept: LAB | Age: 68
End: 2023-10-16

## 2023-10-16 DIAGNOSIS — I10 PRIMARY HYPERTENSION: ICD-10-CM

## 2023-10-16 DIAGNOSIS — N18.31 TYPE 2 DIABETES MELLITUS WITH STAGE 3A CHRONIC KIDNEY DISEASE, WITH LONG-TERM CURRENT USE OF INSULIN (HCC): ICD-10-CM

## 2023-10-16 DIAGNOSIS — Z79.4 TYPE 2 DIABETES MELLITUS WITH STAGE 3A CHRONIC KIDNEY DISEASE, WITH LONG-TERM CURRENT USE OF INSULIN (HCC): ICD-10-CM

## 2023-10-16 DIAGNOSIS — N28.9 RENAL INSUFFICIENCY: ICD-10-CM

## 2023-10-16 DIAGNOSIS — E11.22 TYPE 2 DIABETES MELLITUS WITH STAGE 3A CHRONIC KIDNEY DISEASE, WITH LONG-TERM CURRENT USE OF INSULIN (HCC): ICD-10-CM

## 2023-10-16 LAB
ALBUMIN SERPL BCG-MCNC: 4.1 G/DL (ref 3.5–5.1)
ALP SERPL-CCNC: 76 U/L (ref 38–126)
ALT SERPL W/O P-5'-P-CCNC: 12 U/L (ref 11–66)
ANION GAP SERPL CALC-SCNC: 14 MEQ/L (ref 8–16)
AST SERPL-CCNC: 15 U/L (ref 5–40)
BILIRUB SERPL-MCNC: 0.4 MG/DL (ref 0.3–1.2)
BUN SERPL-MCNC: 24 MG/DL (ref 7–22)
CALCIUM SERPL-MCNC: 10.9 MG/DL (ref 8.5–10.5)
CHLORIDE SERPL-SCNC: 94 MEQ/L (ref 98–111)
CO2 SERPL-SCNC: 28 MEQ/L (ref 23–33)
CREAT SERPL-MCNC: 1.3 MG/DL (ref 0.4–1.2)
DEPRECATED MEAN GLUCOSE BLD GHB EST-ACNC: 156 MG/DL (ref 70–126)
GFR SERPL CREATININE-BSD FRML MDRD: 44 ML/MIN/1.73M2
GLUCOSE FASTING: 176 MG/DL (ref 70–108)
GLUCOSE SERPL-MCNC: 176 MG/DL (ref 70–108)
HBA1C MFR BLD HPLC: 7.2 % (ref 4.4–6.4)
POTASSIUM SERPL-SCNC: 4.3 MEQ/L (ref 3.5–5.2)
PROT SERPL-MCNC: 7.2 G/DL (ref 6.1–8)
SODIUM SERPL-SCNC: 136 MEQ/L (ref 135–145)

## 2023-11-08 RX ORDER — LISINOPRIL AND HYDROCHLOROTHIAZIDE 12.5; 1 MG/1; MG/1
TABLET ORAL
Qty: 180 TABLET | Refills: 3 | Status: SHIPPED | OUTPATIENT
Start: 2023-11-08

## 2023-12-12 RX ORDER — ESCITALOPRAM OXALATE 20 MG/1
TABLET ORAL
Qty: 90 TABLET | Refills: 1 | Status: SHIPPED | OUTPATIENT
Start: 2023-12-12

## 2024-01-04 ENCOUNTER — HOSPITAL ENCOUNTER (OUTPATIENT)
Age: 69
Discharge: HOME | End: 2024-01-04

## 2024-01-05 ENCOUNTER — HOSPITAL ENCOUNTER (OUTPATIENT)
Age: 69
Setting detail: SPECIMEN
Discharge: HOME | End: 2024-01-05
Payer: MEDICARE

## 2024-01-05 DIAGNOSIS — R19.7 DIARRHEA, UNSPECIFIED TYPE: ICD-10-CM

## 2024-01-05 PROCEDURE — 87507 IADNA-DNA/RNA PROBE TQ 12-25: CPT

## 2024-01-06 LAB
C CAYETANENSIS DNA SPEC QL NAA+PROBE: NOT DETECTED
CAMPY SP DNA.DIARRHEA STL QL NAA+PROBE: NOT DETECTED
CRYPTOSP DNA SPEC QL NAA+PROBE: NOT DETECTED
E COLI O157H7 DNA SPEC QL NAA+PROBE: NORMAL
E HISTOLYT DNA SPEC QL NAA+PROBE: NOT DETECTED
EAEC PAA PLAS AGGR+AATA ST NAA+NON-PRB: NOT DETECTED
EC STX1+STX2 + H7 FLIC SPEC NAA+PROBE: NOT DETECTED
EPEC EAE GENE STL QL NAA+NON-PROBE: NOT DETECTED
ETEC LTA+ST1A+ST1B TOX ST NAA+NON-PROBE: NOT DETECTED
G LAMBLIA DNA SPEC QL NAA+PROBE: NOT DETECTED
HADV DNA SPEC QL NAA+PROBE: NOT DETECTED
HASTV RNA SPEC QL NAA+PROBE: NOT DETECTED
NOROVIRUS GI + GII RNA STL NAA+PROBE: NOT DETECTED
P SHIGELLOIDES DNA STL QL NAA+PROBE: NOT DETECTED
RV RNA SPEC QL NAA+PROBE: NOT DETECTED
SALMONELLA DNA SPEC QL NAA+PROBE: NOT DETECTED
SAPOVIRUS RNA SPEC QL NAA+PROBE: NOT DETECTED
SHIGELLA SP+EIEC IPAH ST NAA+NON-PROBE: NOT DETECTED
V CHOLERAE DNA SPEC QL NAA+PROBE: NOT DETECTED
VIBRIO DNA SPEC NAA+PROBE: NOT DETECTED
Y ENTERO RECN STL QL NAA+PROBE: NOT DETECTED

## 2024-01-07 ENCOUNTER — PATIENT MESSAGE (OUTPATIENT)
Dept: FAMILY MEDICINE CLINIC | Age: 69
End: 2024-01-07

## 2024-01-08 NOTE — TELEPHONE ENCOUNTER
Last appointment this department: Visit date not found  Next appointment this department: 2/15/2024

## 2024-01-24 RX ORDER — METOPROLOL TARTRATE 100 MG/1
TABLET ORAL
Qty: 270 TABLET | Refills: 3 | Status: SHIPPED | OUTPATIENT
Start: 2024-01-24

## 2024-01-24 RX ORDER — LEVOTHYROXINE SODIUM 0.1 MG/1
100 TABLET ORAL DAILY
Qty: 90 TABLET | Refills: 3 | Status: SHIPPED | OUTPATIENT
Start: 2024-01-24

## 2024-01-24 RX ORDER — ESCITALOPRAM OXALATE 20 MG/1
TABLET ORAL
Qty: 135 TABLET | Refills: 1 | Status: SHIPPED | OUTPATIENT
Start: 2024-01-24

## 2024-01-24 RX ORDER — LISINOPRIL AND HYDROCHLOROTHIAZIDE 12.5; 1 MG/1; MG/1
TABLET ORAL
Qty: 180 TABLET | Refills: 3 | Status: SHIPPED | OUTPATIENT
Start: 2024-01-24

## 2024-02-12 ENCOUNTER — NURSE ONLY (OUTPATIENT)
Dept: LAB | Age: 69
End: 2024-02-12

## 2024-02-12 DIAGNOSIS — I10 PRIMARY HYPERTENSION: ICD-10-CM

## 2024-02-12 DIAGNOSIS — E11.22 TYPE 2 DIABETES MELLITUS WITH STAGE 3A CHRONIC KIDNEY DISEASE, WITH LONG-TERM CURRENT USE OF INSULIN (HCC): Primary | ICD-10-CM

## 2024-02-12 DIAGNOSIS — N28.9 RENAL INSUFFICIENCY: ICD-10-CM

## 2024-02-12 DIAGNOSIS — N18.31 TYPE 2 DIABETES MELLITUS WITH STAGE 3A CHRONIC KIDNEY DISEASE, WITH LONG-TERM CURRENT USE OF INSULIN (HCC): Primary | ICD-10-CM

## 2024-02-12 DIAGNOSIS — Z79.4 TYPE 2 DIABETES MELLITUS WITH STAGE 3A CHRONIC KIDNEY DISEASE, WITH LONG-TERM CURRENT USE OF INSULIN (HCC): Primary | ICD-10-CM

## 2024-02-12 DIAGNOSIS — E78.2 MIXED HYPERLIPIDEMIA: ICD-10-CM

## 2024-02-12 DIAGNOSIS — N18.31 TYPE 2 DIABETES MELLITUS WITH STAGE 3A CHRONIC KIDNEY DISEASE, WITH LONG-TERM CURRENT USE OF INSULIN (HCC): ICD-10-CM

## 2024-02-12 DIAGNOSIS — I87.2 CHRONIC VENOUS INSUFFICIENCY: ICD-10-CM

## 2024-02-12 DIAGNOSIS — Z79.4 TYPE 2 DIABETES MELLITUS WITH STAGE 3A CHRONIC KIDNEY DISEASE, WITH LONG-TERM CURRENT USE OF INSULIN (HCC): ICD-10-CM

## 2024-02-12 DIAGNOSIS — E11.22 TYPE 2 DIABETES MELLITUS WITH STAGE 3A CHRONIC KIDNEY DISEASE, WITH LONG-TERM CURRENT USE OF INSULIN (HCC): ICD-10-CM

## 2024-02-12 LAB
ALBUMIN SERPL BCG-MCNC: 4.2 G/DL (ref 3.5–5.1)
ALP SERPL-CCNC: 72 U/L (ref 38–126)
ALT SERPL W/O P-5'-P-CCNC: 10 U/L (ref 11–66)
ANION GAP SERPL CALC-SCNC: 11 MEQ/L (ref 8–16)
AST SERPL-CCNC: 14 U/L (ref 5–40)
BILIRUB SERPL-MCNC: 0.4 MG/DL (ref 0.3–1.2)
BUN SERPL-MCNC: 36 MG/DL (ref 7–22)
CALCIUM SERPL-MCNC: 10.6 MG/DL (ref 8.5–10.5)
CHLORIDE SERPL-SCNC: 94 MEQ/L (ref 98–111)
CO2 SERPL-SCNC: 28 MEQ/L (ref 23–33)
CREAT SERPL-MCNC: 1.3 MG/DL (ref 0.4–1.2)
CREAT UR-MCNC: 101.8 MG/DL
GFR SERPL CREATININE-BSD FRML MDRD: 44 ML/MIN/1.73M2
GLUCOSE SERPL-MCNC: 142 MG/DL (ref 70–108)
MICROALBUMIN UR-MCNC: < 1.2 MG/DL
MICROALBUMIN/CREAT RATIO PNL UR: 12 MG/G (ref 0–30)
POTASSIUM SERPL-SCNC: 4.4 MEQ/L (ref 3.5–5.2)
PROT SERPL-MCNC: 7.3 G/DL (ref 6.1–8)
SODIUM SERPL-SCNC: 133 MEQ/L (ref 135–145)

## 2024-02-13 LAB
DEPRECATED MEAN GLUCOSE BLD GHB EST-ACNC: 162 MG/DL (ref 70–126)
HBA1C MFR BLD HPLC: 7.4 % (ref 4.4–6.4)

## 2024-02-15 ENCOUNTER — OFFICE VISIT (OUTPATIENT)
Dept: FAMILY MEDICINE CLINIC | Age: 69
End: 2024-02-15

## 2024-02-15 VITALS
HEIGHT: 65 IN | SYSTOLIC BLOOD PRESSURE: 130 MMHG | WEIGHT: 287 LBS | BODY MASS INDEX: 47.82 KG/M2 | DIASTOLIC BLOOD PRESSURE: 78 MMHG | HEART RATE: 72 BPM | OXYGEN SATURATION: 97 %

## 2024-02-15 DIAGNOSIS — N18.31 TYPE 2 DIABETES MELLITUS WITH STAGE 3A CHRONIC KIDNEY DISEASE, WITH LONG-TERM CURRENT USE OF INSULIN (HCC): ICD-10-CM

## 2024-02-15 DIAGNOSIS — Z79.4 TYPE 2 DIABETES MELLITUS WITH STAGE 3A CHRONIC KIDNEY DISEASE, WITH LONG-TERM CURRENT USE OF INSULIN (HCC): ICD-10-CM

## 2024-02-15 DIAGNOSIS — E78.00 PURE HYPERCHOLESTEROLEMIA: ICD-10-CM

## 2024-02-15 DIAGNOSIS — I48.0 PAROXYSMAL ATRIAL FIBRILLATION (HCC): ICD-10-CM

## 2024-02-15 DIAGNOSIS — E03.9 ACQUIRED HYPOTHYROIDISM: Primary | ICD-10-CM

## 2024-02-15 DIAGNOSIS — E11.22 TYPE 2 DIABETES MELLITUS WITH STAGE 3A CHRONIC KIDNEY DISEASE, WITH LONG-TERM CURRENT USE OF INSULIN (HCC): ICD-10-CM

## 2024-02-15 DIAGNOSIS — F32.A DEPRESSIVE DISORDER: ICD-10-CM

## 2024-02-15 RX ORDER — ESCITALOPRAM OXALATE 20 MG/1
20 TABLET ORAL DAILY
Qty: 90 TABLET | Refills: 3 | Status: SHIPPED | OUTPATIENT
Start: 2024-02-15

## 2024-02-15 NOTE — PROGRESS NOTES
Ayla Marie is a 68 y.o. female who presents today for:  Chief Complaint   Patient presents with    Diabetes    Hypertension    Hypothyroidism       HPI:   Ayla Marie is 68 y.o. female with a history of A-fib, SCOT, HLD, depressive disorder, hypertension.  Here for routine office follow-up.  Patient also complains of cough, dry mouth, acid reflux.    Hypertension, does not always check blood pressures, has headaches but states that they are tension type's and not related to elevated BP, tolerates lisinopril.  Blood pressure 130/78 in the office.    Diabetes, has a continuous glucose monitor, glucose 190s, denies any hypoglycemic episodes recently.  On metformin, Mounjaro, Invokana.  Tolerates medications well    Hypothyroidism currently controlled with Synthroid, denies any weight gain    SCOT on CPAP.    Objective:     Vitals:    02/15/24 1029   BP: 130/78   Site: Right Upper Arm   Position: Sitting   Cuff Size: Large Adult   Pulse: 72   SpO2: 97%   Weight: 130.2 kg (287 lb)   Height: 1.651 m (5' 5\")       Wt Readings from Last 3 Encounters:   02/15/24 130.2 kg (287 lb)   10/12/23 129.5 kg (285 lb 9.6 oz)   09/18/23 130.9 kg (288 lb 9.6 oz)       BP Readings from Last 3 Encounters:   02/15/24 130/78   10/12/23 136/80   09/18/23 98/62       Lab Results   Component Value Date    WBC 7.7 04/26/2023    HGB 14.0 04/26/2023    HCT 44.7 04/26/2023    MCV 92.4 04/26/2023     04/26/2023     Lab Results   Component Value Date     (L) 02/12/2024    K 4.4 02/12/2024    CL 94 (L) 02/12/2024    CO2 28 02/12/2024    BUN 36 (H) 02/12/2024    CREATININE 1.3 (H) 02/12/2024    GLUCOSE 142 (H) 02/12/2024    CALCIUM 10.6 (H) 02/12/2024    PROT 7.3 02/12/2024    LABALBU 4.2 02/12/2024    BILITOT 0.4 02/12/2024    ALKPHOS 72 02/12/2024    AST 14 02/12/2024    ALT 10 (L) 02/12/2024    LABGLOM 44 (A) 02/12/2024     Lab Results   Component Value Date    TSH 3.880 04/26/2023     Lab Results   Component Value Date

## 2024-02-15 NOTE — PROGRESS NOTES
[facility]  [address]  [place] [address]  Dept: [phone]     SUBJECTIVE     Ayla Marie is a 68 y.o.female    Pt presents for follow up of medications.   Pt c/o increase in acid reflux over the last couple months. Pt has been taking Tums with some relief.  Pt c/o chronic cough.    Pt feeling ok since last visit- interval history and any new issues noted below:       Patient Active Problem List   Diagnosis    COVID-19    Atrial fibrillation (HCC)    Chronic venous insufficiency    Depressive disorder    Hyperlipidemia    Obesity    Obstructive sleep apnea syndrome    Spinal stenosis of lumbar region    Body mass index (BMI) 50.0-59.9, adult (HCC)    Acquired hypothyroidism       Current Outpatient Medications   Medication Sig Dispense Refill    escitalopram (LEXAPRO) 20 MG tablet Take 1 tablet by mouth daily 90 tablet 3    dapagliflozin (FARXIGA) 10 MG tablet Take 1 tablet by mouth every morning 90 tablet 3    lisinopril-hydroCHLOROthiazide (PRINZIDE;ZESTORETIC) 10-12.5 MG per tablet TAKE ONE TABLET, TWO TIMES A DAY, BY MOUTH. 180 tablet 3    metoprolol (LOPRESSOR) 100 MG tablet TAKE 1 & (1/2) TABLETS, TWO TIMES A DAY, BY MOUTH. 270 tablet 3    levothyroxine (SYNTHROID) 100 MCG tablet Take 1 tablet by mouth daily 90 tablet 3    metFORMIN (GLUCOPHAGE) 500 MG tablet TAKE ONE TABLET THREE TIMES A DAY, BY MOUTH. 180 tablet 1    Acetaminophen (TYLENOL ARTHRITIS PAIN PO) Take 650 mg by mouth in the morning and at bedtime Takes 2 tabs BID      Tirzepatide (MOUNJARO) 15 MG/0.5ML SOPN SC injection Inject 0.5 mLs into the skin once a week 4 Adjustable Dose Pre-filled Pen Syringe 11    ELIQUIS 5 MG TABS tablet Take 1 tablet by mouth in the morning and at bedtime      melatonin 3 MG TABS tablet Take 1 tablet by mouth nightly as needed      vitamin C (ASCORBIC ACID) 500 MG tablet Take 1 tablet by mouth daily      fluticasone (FLONASE) 50 MCG/ACT nasal spray 1 spray by

## 2024-02-19 ENCOUNTER — TELEPHONE (OUTPATIENT)
Dept: FAMILY MEDICINE CLINIC | Age: 69
End: 2024-02-19

## 2024-02-19 DIAGNOSIS — F41.8 SITUATIONAL ANXIETY: Primary | ICD-10-CM

## 2024-02-19 RX ORDER — ALPRAZOLAM 0.25 MG/1
0.25 TABLET ORAL 3 TIMES DAILY PRN
Qty: 20 TABLET | Refills: 0 | Status: SHIPPED | OUTPATIENT
Start: 2024-02-19 | End: 2024-03-20

## 2024-03-15 LAB
ALBUMIN SERPL-MCNC: NORMAL G/DL
ALP BLD-CCNC: NORMAL U/L
ALT SERPL-CCNC: NORMAL U/L
ANION GAP SERPL CALCULATED.3IONS-SCNC: NORMAL MMOL/L
AST SERPL-CCNC: NORMAL U/L
BASOPHILS ABSOLUTE: NORMAL
BASOPHILS RELATIVE PERCENT: NORMAL
BILIRUB SERPL-MCNC: NORMAL MG/DL
BUN BLDV-MCNC: 22 MG/DL
BUN BLDV-MCNC: 30 MG/DL
CALCIUM SERPL-MCNC: 11.2 MG/DL
CALCIUM SERPL-MCNC: 9.9 MG/DL
CHLORIDE BLD-SCNC: 97 MMOL/L
CHLORIDE BLD-SCNC: NORMAL MMOL/L
CO2: 28 MMOL/L
CO2: NORMAL
CREAT SERPL-MCNC: 1.2 MG/DL
CREAT SERPL-MCNC: 1.27 MG/DL
EGFR: 45
EGFR: NORMAL
EOSINOPHILS ABSOLUTE: NORMAL
EOSINOPHILS RELATIVE PERCENT: NORMAL
GLUCOSE BLD-MCNC: 177 MG/DL
GLUCOSE BLD-MCNC: NORMAL MG/DL
HCT VFR BLD CALC: 45 % (ref 36–46)
HEMOGLOBIN: 14.3 G/DL (ref 12–16)
LYMPHOCYTES ABSOLUTE: NORMAL
LYMPHOCYTES RELATIVE PERCENT: NORMAL
MCH RBC QN AUTO: NORMAL PG
MCHC RBC AUTO-ENTMCNC: NORMAL G/DL
MCV RBC AUTO: NORMAL FL
MONOCYTES ABSOLUTE: NORMAL
MONOCYTES RELATIVE PERCENT: NORMAL
NEUTROPHILS ABSOLUTE: NORMAL
NEUTROPHILS RELATIVE PERCENT: NORMAL
PLATELET # BLD: 305 K/ΜL
PMV BLD AUTO: 9.8 FL
POTASSIUM SERPL-SCNC: 4.6 MMOL/L
POTASSIUM SERPL-SCNC: NORMAL MMOL/L
RBC # BLD: NORMAL 10*6/UL
SODIUM BLD-SCNC: 135 MMOL/L
SODIUM BLD-SCNC: NORMAL MMOL/L
TOTAL PROTEIN: NORMAL
WBC # BLD: 10.5 10^3/ML

## 2024-03-22 ENCOUNTER — TELEPHONE (OUTPATIENT)
Dept: FAMILY MEDICINE CLINIC | Age: 69
End: 2024-03-22

## 2024-03-22 NOTE — TELEPHONE ENCOUNTER
[facility] heart cath called and was checking to see if you had gotten the labs done prior to her heart cath and post because she was holding her Metformin. He states her baseline GFR was 48 and recheck 2 days post cath was 45. I did inform him that you had a message sent to patient about restarting her Metformin.

## 2024-04-25 DIAGNOSIS — N18.31 TYPE 2 DIABETES MELLITUS WITH STAGE 3A CHRONIC KIDNEY DISEASE, WITH LONG-TERM CURRENT USE OF INSULIN (HCC): ICD-10-CM

## 2024-04-25 DIAGNOSIS — Z79.4 TYPE 2 DIABETES MELLITUS WITH STAGE 3A CHRONIC KIDNEY DISEASE, WITH LONG-TERM CURRENT USE OF INSULIN (HCC): ICD-10-CM

## 2024-04-25 DIAGNOSIS — E11.22 TYPE 2 DIABETES MELLITUS WITH STAGE 3A CHRONIC KIDNEY DISEASE, WITH LONG-TERM CURRENT USE OF INSULIN (HCC): ICD-10-CM

## 2024-04-26 RX ORDER — TIRZEPATIDE 15 MG/.5ML
INJECTION, SOLUTION SUBCUTANEOUS
Qty: 2 ML | Refills: 11 | Status: SHIPPED | OUTPATIENT
Start: 2024-04-26

## 2024-04-30 ENCOUNTER — NURSE ONLY (OUTPATIENT)
Dept: LAB | Age: 69
End: 2024-04-30

## 2024-04-30 DIAGNOSIS — Z79.4 TYPE 2 DIABETES MELLITUS WITH STAGE 3A CHRONIC KIDNEY DISEASE, WITH LONG-TERM CURRENT USE OF INSULIN (HCC): ICD-10-CM

## 2024-04-30 DIAGNOSIS — E78.00 PURE HYPERCHOLESTEROLEMIA: ICD-10-CM

## 2024-04-30 DIAGNOSIS — E03.9 ACQUIRED HYPOTHYROIDISM: ICD-10-CM

## 2024-04-30 DIAGNOSIS — E11.22 TYPE 2 DIABETES MELLITUS WITH STAGE 3A CHRONIC KIDNEY DISEASE, WITH LONG-TERM CURRENT USE OF INSULIN (HCC): ICD-10-CM

## 2024-04-30 DIAGNOSIS — N18.31 TYPE 2 DIABETES MELLITUS WITH STAGE 3A CHRONIC KIDNEY DISEASE, WITH LONG-TERM CURRENT USE OF INSULIN (HCC): ICD-10-CM

## 2024-04-30 LAB
ALBUMIN SERPL BCG-MCNC: 4.2 G/DL (ref 3.5–5.1)
ALP SERPL-CCNC: 81 U/L (ref 38–126)
ALT SERPL W/O P-5'-P-CCNC: 11 U/L (ref 11–66)
ANION GAP SERPL CALC-SCNC: 13 MEQ/L (ref 8–16)
AST SERPL-CCNC: 16 U/L (ref 5–40)
BASOPHILS ABSOLUTE: 0.1 THOU/MM3 (ref 0–0.1)
BASOPHILS NFR BLD AUTO: 1 %
BILIRUB SERPL-MCNC: 0.5 MG/DL (ref 0.3–1.2)
BUN SERPL-MCNC: 27 MG/DL (ref 7–22)
CALCIUM SERPL-MCNC: 10.3 MG/DL (ref 8.5–10.5)
CHLORIDE SERPL-SCNC: 95 MEQ/L (ref 98–111)
CHOLEST SERPL-MCNC: 167 MG/DL (ref 100–199)
CO2 SERPL-SCNC: 25 MEQ/L (ref 23–33)
CREAT SERPL-MCNC: 1.1 MG/DL (ref 0.4–1.2)
DEPRECATED RDW RBC AUTO: 47.8 FL (ref 35–45)
EOSINOPHIL NFR BLD AUTO: 8.3 %
EOSINOPHILS ABSOLUTE: 0.9 THOU/MM3 (ref 0–0.4)
ERYTHROCYTE [DISTWIDTH] IN BLOOD BY AUTOMATED COUNT: 13.8 % (ref 11.5–14.5)
GFR SERPL CREATININE-BSD FRML MDRD: 54 ML/MIN/1.73M2
GLUCOSE SERPL-MCNC: 197 MG/DL (ref 70–108)
HCT VFR BLD AUTO: 44.1 % (ref 37–47)
HDLC SERPL-MCNC: 45 MG/DL
HGB BLD-MCNC: 14.2 GM/DL (ref 12–16)
IMM GRANULOCYTES # BLD AUTO: 0.14 THOU/MM3 (ref 0–0.07)
IMM GRANULOCYTES NFR BLD AUTO: 1.3 %
LDLC SERPL CALC-MCNC: 84 MG/DL
LYMPHOCYTES ABSOLUTE: 2.8 THOU/MM3 (ref 1–4.8)
LYMPHOCYTES NFR BLD AUTO: 27 %
MCH RBC QN AUTO: 30.5 PG (ref 26–33)
MCHC RBC AUTO-ENTMCNC: 32.2 GM/DL (ref 32.2–35.5)
MCV RBC AUTO: 94.8 FL (ref 81–99)
MONOCYTES ABSOLUTE: 0.6 THOU/MM3 (ref 0.4–1.3)
MONOCYTES NFR BLD AUTO: 6 %
NEUTROPHILS ABSOLUTE: 5.9 THOU/MM3 (ref 1.8–7.7)
NEUTROPHILS NFR BLD AUTO: 56.4 %
NRBC BLD AUTO-RTO: 0 /100 WBC
PLATELET # BLD AUTO: 272 THOU/MM3 (ref 130–400)
PMV BLD AUTO: 10.5 FL (ref 9.4–12.4)
POTASSIUM SERPL-SCNC: 4.4 MEQ/L (ref 3.5–5.2)
PROT SERPL-MCNC: 7.3 G/DL (ref 6.1–8)
RBC # BLD AUTO: 4.65 MILL/MM3 (ref 4.2–5.4)
SODIUM SERPL-SCNC: 133 MEQ/L (ref 135–145)
TRIGL SERPL-MCNC: 191 MG/DL (ref 0–199)
TSH SERPL DL<=0.005 MIU/L-ACNC: 1.8 UIU/ML (ref 0.4–4.2)
WBC # BLD AUTO: 10.5 THOU/MM3 (ref 4.8–10.8)

## 2024-04-30 SDOH — ECONOMIC STABILITY: FOOD INSECURITY: WITHIN THE PAST 12 MONTHS, YOU WORRIED THAT YOUR FOOD WOULD RUN OUT BEFORE YOU GOT MONEY TO BUY MORE.: NEVER TRUE

## 2024-04-30 SDOH — ECONOMIC STABILITY: TRANSPORTATION INSECURITY
IN THE PAST 12 MONTHS, HAS LACK OF TRANSPORTATION KEPT YOU FROM MEETINGS, WORK, OR FROM GETTING THINGS NEEDED FOR DAILY LIVING?: NO

## 2024-04-30 SDOH — ECONOMIC STABILITY: INCOME INSECURITY: HOW HARD IS IT FOR YOU TO PAY FOR THE VERY BASICS LIKE FOOD, HOUSING, MEDICAL CARE, AND HEATING?: NOT VERY HARD

## 2024-04-30 SDOH — ECONOMIC STABILITY: FOOD INSECURITY: WITHIN THE PAST 12 MONTHS, THE FOOD YOU BOUGHT JUST DIDN'T LAST AND YOU DIDN'T HAVE MONEY TO GET MORE.: NEVER TRUE

## 2024-05-01 LAB
DEPRECATED MEAN GLUCOSE BLD GHB EST-ACNC: 174 MG/DL (ref 70–126)
HBA1C MFR BLD HPLC: 7.8 % (ref 4.4–6.4)

## 2024-05-02 ENCOUNTER — OFFICE VISIT (OUTPATIENT)
Dept: FAMILY MEDICINE CLINIC | Age: 69
End: 2024-05-02
Payer: MEDICARE

## 2024-05-02 VITALS
BODY MASS INDEX: 46.52 KG/M2 | DIASTOLIC BLOOD PRESSURE: 74 MMHG | WEIGHT: 279.2 LBS | SYSTOLIC BLOOD PRESSURE: 130 MMHG | OXYGEN SATURATION: 98 % | HEIGHT: 65 IN | HEART RATE: 72 BPM

## 2024-05-02 DIAGNOSIS — I87.2 CHRONIC VENOUS INSUFFICIENCY: ICD-10-CM

## 2024-05-02 DIAGNOSIS — F32.A DEPRESSIVE DISORDER: ICD-10-CM

## 2024-05-02 DIAGNOSIS — E03.9 ACQUIRED HYPOTHYROIDISM: ICD-10-CM

## 2024-05-02 DIAGNOSIS — E78.00 PURE HYPERCHOLESTEROLEMIA: ICD-10-CM

## 2024-05-02 DIAGNOSIS — G47.33 OBSTRUCTIVE SLEEP APNEA SYNDROME: ICD-10-CM

## 2024-05-02 DIAGNOSIS — N18.31 TYPE 2 DIABETES MELLITUS WITH STAGE 3A CHRONIC KIDNEY DISEASE, WITHOUT LONG-TERM CURRENT USE OF INSULIN (HCC): ICD-10-CM

## 2024-05-02 DIAGNOSIS — I48.0 PAROXYSMAL ATRIAL FIBRILLATION (HCC): Primary | ICD-10-CM

## 2024-05-02 DIAGNOSIS — E11.22 TYPE 2 DIABETES MELLITUS WITH STAGE 3A CHRONIC KIDNEY DISEASE, WITHOUT LONG-TERM CURRENT USE OF INSULIN (HCC): ICD-10-CM

## 2024-05-02 PROBLEM — E11.9 TYPE 2 DIABETES MELLITUS (HCC): Status: ACTIVE | Noted: 2024-05-02

## 2024-05-02 PROBLEM — E66.9 OBESITY: Status: RESOLVED | Noted: 2022-07-27 | Resolved: 2024-05-02

## 2024-05-02 PROBLEM — U07.1 COVID-19: Status: RESOLVED | Noted: 2021-12-21 | Resolved: 2024-05-02

## 2024-05-02 PROBLEM — M48.061 SPINAL STENOSIS OF LUMBAR REGION: Status: RESOLVED | Noted: 2022-07-27 | Resolved: 2024-05-02

## 2024-05-02 PROCEDURE — 99214 OFFICE O/P EST MOD 30 MIN: CPT | Performed by: FAMILY MEDICINE

## 2024-05-02 PROCEDURE — 1036F TOBACCO NON-USER: CPT | Performed by: FAMILY MEDICINE

## 2024-05-02 PROCEDURE — 1090F PRES/ABSN URINE INCON ASSESS: CPT | Performed by: FAMILY MEDICINE

## 2024-05-02 PROCEDURE — 1123F ACP DISCUSS/DSCN MKR DOCD: CPT | Performed by: FAMILY MEDICINE

## 2024-05-02 PROCEDURE — 2022F DILAT RTA XM EVC RTNOPTHY: CPT | Performed by: FAMILY MEDICINE

## 2024-05-02 PROCEDURE — 3017F COLORECTAL CA SCREEN DOC REV: CPT | Performed by: FAMILY MEDICINE

## 2024-05-02 PROCEDURE — 3051F HG A1C>EQUAL 7.0%<8.0%: CPT | Performed by: FAMILY MEDICINE

## 2024-05-02 PROCEDURE — G8417 CALC BMI ABV UP PARAM F/U: HCPCS | Performed by: FAMILY MEDICINE

## 2024-05-02 PROCEDURE — G8427 DOCREV CUR MEDS BY ELIG CLIN: HCPCS | Performed by: FAMILY MEDICINE

## 2024-05-02 PROCEDURE — G8400 PT W/DXA NO RESULTS DOC: HCPCS | Performed by: FAMILY MEDICINE

## 2024-05-02 RX ORDER — CANAGLIFLOZIN 300 MG/1
300 TABLET, FILM COATED ORAL
COMMUNITY
Start: 2024-03-28

## 2024-05-07 ENCOUNTER — TELEPHONE (OUTPATIENT)
Dept: FAMILY MEDICINE CLINIC | Age: 69
End: 2024-05-07

## 2024-05-07 NOTE — TELEPHONE ENCOUNTER
Received a call from Tran at [facility]. They stated patient was to be seen on 5/2/2024 and had a form that needed completed and sent back. She stated she was to have a diabetic foot exam and needed this form and office visit note sent back to them. I do not have a form in patients chart.     Can you please complete office visit note from 5/2/2024 and let me know if you have a form or completed a form that day.

## 2024-06-04 ENCOUNTER — PATIENT MESSAGE (OUTPATIENT)
Dept: FAMILY MEDICINE CLINIC | Age: 69
End: 2024-06-04

## 2024-06-04 NOTE — TELEPHONE ENCOUNTER
From: Ayla Marie  To: Dr. Allen Hernandez  Sent: 6/4/2024 12:10 PM EDT  Subject: Kidney    Dr. De La Torre noticed that my 'kidney levels' have been going up on the bloodwork hes been doing. He suggested I let you know.  Ayla

## 2024-06-07 ENCOUNTER — PATIENT MESSAGE (OUTPATIENT)
Dept: FAMILY MEDICINE CLINIC | Age: 69
End: 2024-06-07

## 2024-06-07 DIAGNOSIS — E11.22 TYPE 2 DIABETES MELLITUS WITH STAGE 3A CHRONIC KIDNEY DISEASE, WITHOUT LONG-TERM CURRENT USE OF INSULIN (HCC): Primary | ICD-10-CM

## 2024-06-07 DIAGNOSIS — N18.31 TYPE 2 DIABETES MELLITUS WITH STAGE 3A CHRONIC KIDNEY DISEASE, WITHOUT LONG-TERM CURRENT USE OF INSULIN (HCC): Primary | ICD-10-CM

## 2024-06-07 NOTE — TELEPHONE ENCOUNTER
From: Ayla Marie  To: Dr. Allen Hernandez  Sent: 6/7/2024 1:23 PM EDT  Subject: teja Martínez at [facility] now has 12.5 mounjaro and does not know when the 15 is coming. He wonders if I want the 12.5. But I need a scrip. Thanks.

## 2024-06-11 RX ORDER — TIRZEPATIDE 12.5 MG/.5ML
INJECTION, SOLUTION SUBCUTANEOUS
Qty: 2 ML | Refills: 0 | OUTPATIENT
Start: 2024-06-11

## 2024-06-18 ENCOUNTER — TELEPHONE (OUTPATIENT)
Dept: FAMILY MEDICINE CLINIC | Age: 69
End: 2024-06-18

## 2024-09-03 ENCOUNTER — LAB (OUTPATIENT)
Dept: LAB | Age: 69
End: 2024-09-03

## 2024-09-03 DIAGNOSIS — E11.22 TYPE 2 DIABETES MELLITUS WITH STAGE 3A CHRONIC KIDNEY DISEASE, WITHOUT LONG-TERM CURRENT USE OF INSULIN (HCC): ICD-10-CM

## 2024-09-03 DIAGNOSIS — E03.9 ACQUIRED HYPOTHYROIDISM: ICD-10-CM

## 2024-09-03 DIAGNOSIS — N18.31 TYPE 2 DIABETES MELLITUS WITH STAGE 3A CHRONIC KIDNEY DISEASE, WITHOUT LONG-TERM CURRENT USE OF INSULIN (HCC): ICD-10-CM

## 2024-09-03 DIAGNOSIS — E78.00 PURE HYPERCHOLESTEROLEMIA: ICD-10-CM

## 2024-09-03 LAB
ALBUMIN SERPL BCG-MCNC: 4.2 G/DL (ref 3.5–5.1)
ALP SERPL-CCNC: 79 U/L (ref 38–126)
ALT SERPL W/O P-5'-P-CCNC: 10 U/L (ref 11–66)
ANION GAP SERPL CALC-SCNC: 15 MEQ/L (ref 8–16)
AST SERPL-CCNC: 15 U/L (ref 5–40)
BILIRUB SERPL-MCNC: 0.3 MG/DL (ref 0.3–1.2)
BUN SERPL-MCNC: 27 MG/DL (ref 7–22)
CALCIUM SERPL-MCNC: 10.6 MG/DL (ref 8.5–10.5)
CHLORIDE SERPL-SCNC: 96 MEQ/L (ref 98–111)
CO2 SERPL-SCNC: 23 MEQ/L (ref 23–33)
CREAT SERPL-MCNC: 1.2 MG/DL (ref 0.4–1.2)
DEPRECATED MEAN GLUCOSE BLD GHB EST-ACNC: 168 MG/DL (ref 70–126)
GFR SERPL CREATININE-BSD FRML MDRD: 48 ML/MIN/1.73M2
GLUCOSE SERPL-MCNC: 198 MG/DL (ref 70–108)
HBA1C MFR BLD HPLC: 7.6 % (ref 4.4–6.4)
POTASSIUM SERPL-SCNC: 4.8 MEQ/L (ref 3.5–5.2)
PROT SERPL-MCNC: 7.1 G/DL (ref 6.1–8)
SODIUM SERPL-SCNC: 134 MEQ/L (ref 135–145)
TSH SERPL DL<=0.005 MIU/L-ACNC: 1.78 UIU/ML (ref 0.4–4.2)

## 2024-09-05 ENCOUNTER — OFFICE VISIT (OUTPATIENT)
Dept: FAMILY MEDICINE CLINIC | Age: 69
End: 2024-09-05

## 2024-09-05 VITALS
OXYGEN SATURATION: 97 % | DIASTOLIC BLOOD PRESSURE: 78 MMHG | HEART RATE: 74 BPM | BODY MASS INDEX: 45.75 KG/M2 | WEIGHT: 274.6 LBS | SYSTOLIC BLOOD PRESSURE: 128 MMHG | HEIGHT: 65 IN

## 2024-09-05 DIAGNOSIS — I48.0 PAROXYSMAL ATRIAL FIBRILLATION (HCC): ICD-10-CM

## 2024-09-05 DIAGNOSIS — N18.31 TYPE 2 DIABETES MELLITUS WITH STAGE 3A CHRONIC KIDNEY DISEASE, WITHOUT LONG-TERM CURRENT USE OF INSULIN (HCC): Primary | ICD-10-CM

## 2024-09-05 DIAGNOSIS — G47.33 OBSTRUCTIVE SLEEP APNEA SYNDROME: ICD-10-CM

## 2024-09-05 DIAGNOSIS — S76.311A HAMSTRING STRAIN, RIGHT, INITIAL ENCOUNTER: ICD-10-CM

## 2024-09-05 DIAGNOSIS — E78.00 PURE HYPERCHOLESTEROLEMIA: ICD-10-CM

## 2024-09-05 DIAGNOSIS — E03.9 ACQUIRED HYPOTHYROIDISM: ICD-10-CM

## 2024-09-05 DIAGNOSIS — F32.A DEPRESSIVE DISORDER: ICD-10-CM

## 2024-09-05 DIAGNOSIS — E11.22 TYPE 2 DIABETES MELLITUS WITH STAGE 3A CHRONIC KIDNEY DISEASE, WITHOUT LONG-TERM CURRENT USE OF INSULIN (HCC): Primary | ICD-10-CM

## 2024-09-05 DIAGNOSIS — I87.2 CHRONIC VENOUS INSUFFICIENCY: ICD-10-CM

## 2024-09-05 RX ORDER — CANAGLIFLOZIN 300 MG/1
300 TABLET, FILM COATED ORAL DAILY
Qty: 90 TABLET | Refills: 3 | Status: SHIPPED | OUTPATIENT
Start: 2024-09-05

## 2024-09-05 ASSESSMENT — ENCOUNTER SYMPTOMS
SORE THROAT: 0
TROUBLE SWALLOWING: 0
COUGH: 0
CONSTIPATION: 0
DIARRHEA: 0
VOMITING: 0
SHORTNESS OF BREATH: 0
ABDOMINAL PAIN: 0

## 2024-09-05 NOTE — PROGRESS NOTES
[facility] [place]  [address]  [place] [address]  Dept: [phone]     SUBJECTIVE     Ayla Marie is a 69 y.o.female    Pt presents for follow up of medications.     Pt feeling ok since last visit- interval history and any new issues noted below:       Patient Active Problem List   Diagnosis    Atrial fibrillation (HCC)    Chronic venous insufficiency    Depressive disorder    Hyperlipidemia    Obstructive sleep apnea syndrome    Body mass index (BMI) 50.0-59.9, adult (HCC)    Acquired hypothyroidism    Type 2 diabetes mellitus       Current Outpatient Medications   Medication Sig Dispense Refill    INVOKANA 300 MG TABS tablet TAKE ONE TABLET DAILY, BY MOUTH. 90 tablet 3    metFORMIN (GLUCOPHAGE) 500 MG tablet TAKE 1 TABLET THREE TIMES A  tablet 5    MOUNJARO 15 MG/0.5ML SOPN SC injection INJECT SUBCUTANEOUSLY ONCE PER WEEK. 2 mL 11    escitalopram (LEXAPRO) 20 MG tablet Take 1 tablet by mouth daily 90 tablet 3    lisinopril-hydroCHLOROthiazide (PRINZIDE;ZESTORETIC) 10-12.5 MG per tablet TAKE ONE TABLET, TWO TIMES A DAY, BY MOUTH. 180 tablet 3    metoprolol (LOPRESSOR) 100 MG tablet TAKE 1 & (1/2) TABLETS, TWO TIMES A DAY, BY MOUTH. 270 tablet 3    levothyroxine (SYNTHROID) 100 MCG tablet Take 1 tablet by mouth daily 90 tablet 3    Acetaminophen (TYLENOL ARTHRITIS PAIN PO) Take 650 mg by mouth in the morning and at bedtime Takes 2 tabs BID      ELIQUIS 5 MG TABS tablet Take 1 tablet by mouth in the morning and at bedtime      melatonin 3 MG TABS tablet Take 1 tablet by mouth nightly as needed      vitamin C (ASCORBIC ACID) 500 MG tablet Take 1 tablet by mouth daily      fluticasone (FLONASE) 50 MCG/ACT nasal spray 1 spray by Nasal route daily      calcium carbonate 600 MG TABS tablet Take 1 tablet by mouth in the morning and at bedtime       No current facility-administered medications for this visit.       Review of Systems   Constitutional:  Negative for

## 2024-09-27 NOTE — PROGRESS NOTES
[Normal] : no acute distress, well nourished, well developed and well-appearing [Normal Sclera/Conjunctiva] : normal sclera/conjunctiva [Normal Outer Ear/Nose] : the outer ears and nose were normal in appearance pharmacy.  Encouraged PREVNAR 20 at this time  Encouraged SHINGLES SHOT (SHINGRIX) - check with insurance re coverage and location of administration.        Allen Hernandez MD  10:12 AM  5/7/24     [No Respiratory Distress] : no respiratory distress  [No Accessory Muscle Use] : no accessory muscle use [Grossly Normal Strength/Tone] : grossly normal strength/tone [No Focal Deficits] : no focal deficits [Normal Gait] : normal gait [Deep Tendon Reflexes (DTR)] : deep tendon reflexes were 2+ and symmetric [2+] : left 2+ [Speech Grossly Normal] : speech grossly normal [Normal Affect] : the affect was normal [Normal Mood] : the mood was normal [de-identified] : vitiligo lit-orbital and involving the digits on her fingers

## 2024-10-21 ENCOUNTER — OFFICE VISIT (OUTPATIENT)
Dept: FAMILY MEDICINE CLINIC | Age: 69
End: 2024-10-21
Payer: MEDICARE

## 2024-10-21 VITALS
OXYGEN SATURATION: 98 % | WEIGHT: 271 LBS | SYSTOLIC BLOOD PRESSURE: 124 MMHG | BODY MASS INDEX: 45.15 KG/M2 | DIASTOLIC BLOOD PRESSURE: 76 MMHG | RESPIRATION RATE: 20 BRPM | HEIGHT: 65 IN | HEART RATE: 68 BPM

## 2024-10-21 DIAGNOSIS — J01.00 ACUTE NON-RECURRENT MAXILLARY SINUSITIS: Primary | ICD-10-CM

## 2024-10-21 DIAGNOSIS — R05.1 ACUTE COUGH: ICD-10-CM

## 2024-10-21 PROCEDURE — G8400 PT W/DXA NO RESULTS DOC: HCPCS | Performed by: NURSE PRACTITIONER

## 2024-10-21 PROCEDURE — G8417 CALC BMI ABV UP PARAM F/U: HCPCS | Performed by: NURSE PRACTITIONER

## 2024-10-21 PROCEDURE — G8427 DOCREV CUR MEDS BY ELIG CLIN: HCPCS | Performed by: NURSE PRACTITIONER

## 2024-10-21 PROCEDURE — 1036F TOBACCO NON-USER: CPT | Performed by: NURSE PRACTITIONER

## 2024-10-21 PROCEDURE — 99213 OFFICE O/P EST LOW 20 MIN: CPT | Performed by: NURSE PRACTITIONER

## 2024-10-21 PROCEDURE — 3017F COLORECTAL CA SCREEN DOC REV: CPT | Performed by: NURSE PRACTITIONER

## 2024-10-21 PROCEDURE — G8484 FLU IMMUNIZE NO ADMIN: HCPCS | Performed by: NURSE PRACTITIONER

## 2024-10-21 PROCEDURE — 1123F ACP DISCUSS/DSCN MKR DOCD: CPT | Performed by: NURSE PRACTITIONER

## 2024-10-21 PROCEDURE — 1090F PRES/ABSN URINE INCON ASSESS: CPT | Performed by: NURSE PRACTITIONER

## 2024-10-21 ASSESSMENT — ENCOUNTER SYMPTOMS
SORE THROAT: 0
SINUS PRESSURE: 1
WHEEZING: 0
DIARRHEA: 0
NAUSEA: 0
CHEST TIGHTNESS: 0
SHORTNESS OF BREATH: 0
SINUS PAIN: 0
CONSTIPATION: 0
COLOR CHANGE: 0
STRIDOR: 0
ABDOMINAL PAIN: 0
BACK PAIN: 0
COUGH: 1
ABDOMINAL DISTENTION: 0
VOMITING: 0

## 2024-10-21 NOTE — PROGRESS NOTES
Ayla Marie (:  1955) is a 69 y.o. female,Established patient, here for evaluation of the following chief complaint(s):  Head Congestion (Coughing sneezing, getting worse. Using zyrtec flonase. Getting worse not better )         Assessment & Plan  Acute non-recurrent maxillary sinusitis    Augmentin.   Fluids, otc symptomatic relievers.         Acute cough                  Return if symptoms worsen or fail to improve.       Subjective   HPI    Cough and congestion.    Started recently.    Using zyrtec and flonase.     Getting worse.    No fevers.     No sob or cp.     Sinus pressure.     10 days ago got worse and 3 days ago got really worse.    Yellow green sinus drainage.   Headaches.         Review of Systems   Constitutional:  Negative for activity change, appetite change, chills, diaphoresis, fatigue, fever and unexpected weight change.   HENT:  Positive for congestion, postnasal drip and sinus pressure. Negative for ear pain, sinus pain and sore throat.    Eyes:  Negative for visual disturbance.   Respiratory:  Positive for cough. Negative for chest tightness, shortness of breath, wheezing and stridor.    Cardiovascular:  Negative for chest pain, palpitations and leg swelling.   Gastrointestinal:  Negative for abdominal distention, abdominal pain, constipation, diarrhea, nausea and vomiting.   Endocrine: Negative for polydipsia, polyphagia and polyuria.   Genitourinary:  Negative for decreased urine volume, difficulty urinating, dysuria, flank pain, frequency, hematuria and urgency.   Musculoskeletal:  Negative for arthralgias, back pain, gait problem, joint swelling, myalgias and neck pain.   Skin:  Negative for color change, pallor and rash.   Neurological:  Negative for dizziness, syncope, weakness, light-headedness, numbness and headaches.   Hematological:  Negative for adenopathy.   Psychiatric/Behavioral:  Negative for behavioral problems, self-injury and sleep disturbance. The patient

## 2024-10-27 ENCOUNTER — PATIENT MESSAGE (OUTPATIENT)
Dept: FAMILY MEDICINE CLINIC | Age: 69
End: 2024-10-27

## 2024-10-27 DIAGNOSIS — R05.1 ACUTE COUGH: Primary | ICD-10-CM

## 2024-10-28 ENCOUNTER — HOSPITAL ENCOUNTER (OUTPATIENT)
Age: 69
Discharge: HOME | End: 2024-10-28
Payer: MEDICARE

## 2024-10-28 ENCOUNTER — HOSPITAL ENCOUNTER (OUTPATIENT)
Dept: GENERAL RADIOLOGY | Age: 69
Discharge: HOME | End: 2024-10-28
Payer: MEDICARE

## 2024-10-28 DIAGNOSIS — R05.1 ACUTE COUGH: ICD-10-CM

## 2024-10-28 PROCEDURE — 71046 X-RAY EXAM CHEST 2 VIEWS: CPT

## 2024-10-29 RX ORDER — BENZONATATE 200 MG/1
200 CAPSULE ORAL 3 TIMES DAILY PRN
Qty: 21 CAPSULE | Refills: 0 | Status: SHIPPED | OUTPATIENT
Start: 2024-10-29 | End: 2024-11-05

## 2024-11-13 ENCOUNTER — PATIENT MESSAGE (OUTPATIENT)
Dept: FAMILY MEDICINE CLINIC | Age: 69
End: 2024-11-13

## 2024-11-13 DIAGNOSIS — R05.1 ACUTE COUGH: Primary | ICD-10-CM

## 2024-11-14 ENCOUNTER — HOSPITAL ENCOUNTER (OUTPATIENT)
Age: 69
Discharge: HOME | End: 2024-11-14
Payer: MEDICARE

## 2024-11-14 ENCOUNTER — HOSPITAL ENCOUNTER (OUTPATIENT)
Dept: GENERAL RADIOLOGY | Age: 69
Discharge: HOME | End: 2024-11-14
Payer: MEDICARE

## 2024-11-14 DIAGNOSIS — R05.1 ACUTE COUGH: ICD-10-CM

## 2024-11-14 PROCEDURE — 71046 X-RAY EXAM CHEST 2 VIEWS: CPT

## 2024-11-14 RX ORDER — DOXYCYCLINE HYCLATE 100 MG
100 TABLET ORAL 2 TIMES DAILY
Qty: 20 TABLET | Refills: 0 | Status: SHIPPED | OUTPATIENT
Start: 2024-11-14 | End: 2024-11-24

## 2024-11-14 NOTE — TELEPHONE ENCOUNTER
Do you want her to come in to be re-evaluated? No appointments left for today. A few with [place] tomorrow. Please advise

## 2024-11-19 ENCOUNTER — PATIENT MESSAGE (OUTPATIENT)
Dept: FAMILY MEDICINE CLINIC | Age: 69
End: 2024-11-19

## 2024-11-19 RX ORDER — FLUCONAZOLE 150 MG/1
150 TABLET ORAL
Qty: 2 TABLET | Refills: 0 | Status: SHIPPED | OUTPATIENT
Start: 2024-11-19 | End: 2024-11-25

## 2024-11-24 ENCOUNTER — PATIENT MESSAGE (OUTPATIENT)
Dept: FAMILY MEDICINE CLINIC | Age: 69
End: 2024-11-24

## 2024-11-26 ENCOUNTER — HOSPITAL ENCOUNTER (OUTPATIENT)
Dept: GENERAL RADIOLOGY | Age: 69
Discharge: HOME | End: 2024-11-26
Payer: MEDICARE

## 2024-11-26 ENCOUNTER — HOSPITAL ENCOUNTER (OUTPATIENT)
Age: 69
Discharge: HOME | End: 2024-11-26
Payer: MEDICARE

## 2024-11-26 ENCOUNTER — OFFICE VISIT (OUTPATIENT)
Dept: FAMILY MEDICINE CLINIC | Age: 69
End: 2024-11-26

## 2024-11-26 VITALS
SYSTOLIC BLOOD PRESSURE: 132 MMHG | HEART RATE: 71 BPM | RESPIRATION RATE: 24 BRPM | TEMPERATURE: 97.5 F | WEIGHT: 267 LBS | HEIGHT: 65 IN | OXYGEN SATURATION: 99 % | DIASTOLIC BLOOD PRESSURE: 78 MMHG | BODY MASS INDEX: 44.48 KG/M2

## 2024-11-26 DIAGNOSIS — R06.02 SHORT OF BREATH ON EXERTION: ICD-10-CM

## 2024-11-26 DIAGNOSIS — J18.9 PNEUMONIA DUE TO INFECTIOUS ORGANISM, UNSPECIFIED LATERALITY, UNSPECIFIED PART OF LUNG: ICD-10-CM

## 2024-11-26 DIAGNOSIS — J18.9 PNEUMONIA DUE TO INFECTIOUS ORGANISM, UNSPECIFIED LATERALITY, UNSPECIFIED PART OF LUNG: Primary | ICD-10-CM

## 2024-11-26 DIAGNOSIS — S29.011A MUSCLE STRAIN OF CHEST WALL, INITIAL ENCOUNTER: ICD-10-CM

## 2024-11-26 PROCEDURE — 71046 X-RAY EXAM CHEST 2 VIEWS: CPT

## 2024-11-26 RX ORDER — PREDNISONE 20 MG/1
20 TABLET ORAL 2 TIMES DAILY
Qty: 10 TABLET | Refills: 0 | Status: SHIPPED | OUTPATIENT
Start: 2024-11-26 | End: 2024-12-01

## 2024-11-26 ASSESSMENT — ENCOUNTER SYMPTOMS
COLOR CHANGE: 0
CHEST TIGHTNESS: 0
STRIDOR: 0
CONSTIPATION: 0
SINUS PRESSURE: 0
NAUSEA: 0
DIARRHEA: 0
COUGH: 1
BACK PAIN: 0
VOMITING: 0
SHORTNESS OF BREATH: 1
ABDOMINAL DISTENTION: 0
SORE THROAT: 0
WHEEZING: 0
ABDOMINAL PAIN: 0

## 2024-11-26 NOTE — PROGRESS NOTES
range of motion.      Cervical back: Normal range of motion and neck supple.   Lymphadenopathy:      Cervical: No cervical adenopathy.   Skin:     General: Skin is warm and dry.   Neurological:      Mental Status: She is alert and oriented to person, place, and time.      Motor: No abnormal muscle tone.      Coordination: Coordination normal.      Gait: Gait normal.   Psychiatric:         Mood and Affect: Mood normal.         Behavior: Behavior normal.         Thought Content: Thought content normal.         Judgment: Judgment normal.            On this date 11/26/2024 I have spent 32 minutes reviewing previous notes, test results and face to face with the patient discussing the diagnosis and importance of compliance with the treatment plan as well as documenting on the day of the visit.      An electronic signature was used to authenticate this note.    --Keo Echeverria, SANA - CNP

## 2024-12-02 ENCOUNTER — LAB (OUTPATIENT)
Dept: LAB | Age: 69
End: 2024-12-02

## 2024-12-02 DIAGNOSIS — E11.22 TYPE 2 DIABETES MELLITUS WITH STAGE 3A CHRONIC KIDNEY DISEASE, WITHOUT LONG-TERM CURRENT USE OF INSULIN (HCC): ICD-10-CM

## 2024-12-02 DIAGNOSIS — E03.9 ACQUIRED HYPOTHYROIDISM: ICD-10-CM

## 2024-12-02 DIAGNOSIS — N18.31 TYPE 2 DIABETES MELLITUS WITH STAGE 3A CHRONIC KIDNEY DISEASE, WITHOUT LONG-TERM CURRENT USE OF INSULIN (HCC): ICD-10-CM

## 2024-12-02 LAB
ALBUMIN SERPL BCG-MCNC: 4.2 G/DL (ref 3.5–5.1)
ALP SERPL-CCNC: 68 U/L (ref 38–126)
ALT SERPL W/O P-5'-P-CCNC: 11 U/L (ref 11–66)
ANION GAP SERPL CALC-SCNC: 14 MEQ/L (ref 8–16)
AST SERPL-CCNC: 15 U/L (ref 5–40)
BILIRUB SERPL-MCNC: 0.4 MG/DL (ref 0.3–1.2)
BUN SERPL-MCNC: 24 MG/DL (ref 7–22)
CALCIUM SERPL-MCNC: 11.8 MG/DL (ref 8.5–10.5)
CHLORIDE SERPL-SCNC: 93 MEQ/L (ref 98–111)
CO2 SERPL-SCNC: 27 MEQ/L (ref 23–33)
CREAT SERPL-MCNC: 1 MG/DL (ref 0.4–1.2)
CREAT UR-MCNC: 66.9 MG/DL
DEPRECATED MEAN GLUCOSE BLD GHB EST-ACNC: 180 MG/DL (ref 70–126)
GFR SERPL CREATININE-BSD FRML MDRD: 60 ML/MIN/1.73M2
GLUCOSE SERPL-MCNC: 142 MG/DL (ref 70–108)
HBA1C MFR BLD HPLC: 8 % (ref 4.4–6.4)
MICROALBUMIN UR-MCNC: < 1.2 MG/DL
MICROALBUMIN/CREAT RATIO PNL UR: 18 MG/G (ref 0–30)
POTASSIUM SERPL-SCNC: 4.7 MEQ/L (ref 3.5–5.2)
PROT SERPL-MCNC: 7.2 G/DL (ref 6.1–8)
SODIUM SERPL-SCNC: 134 MEQ/L (ref 135–145)
TSH SERPL DL<=0.005 MIU/L-ACNC: 0.63 UIU/ML (ref 0.4–4.2)

## 2024-12-12 ENCOUNTER — OFFICE VISIT (OUTPATIENT)
Dept: FAMILY MEDICINE CLINIC | Age: 69
End: 2024-12-12

## 2024-12-12 ENCOUNTER — PATIENT MESSAGE (OUTPATIENT)
Dept: FAMILY MEDICINE CLINIC | Age: 69
End: 2024-12-12

## 2024-12-12 ENCOUNTER — HOSPITAL ENCOUNTER (OUTPATIENT)
Dept: GENERAL RADIOLOGY | Age: 69
Discharge: HOME | End: 2024-12-12
Payer: MEDICARE

## 2024-12-12 ENCOUNTER — HOSPITAL ENCOUNTER (OUTPATIENT)
Age: 69
Discharge: HOME | End: 2024-12-12
Payer: MEDICARE

## 2024-12-12 VITALS
HEART RATE: 72 BPM | BODY MASS INDEX: 44.82 KG/M2 | OXYGEN SATURATION: 99 % | WEIGHT: 269 LBS | DIASTOLIC BLOOD PRESSURE: 76 MMHG | HEIGHT: 65 IN | SYSTOLIC BLOOD PRESSURE: 128 MMHG

## 2024-12-12 DIAGNOSIS — E11.22 TYPE 2 DIABETES MELLITUS WITH STAGE 3A CHRONIC KIDNEY DISEASE, WITHOUT LONG-TERM CURRENT USE OF INSULIN (HCC): ICD-10-CM

## 2024-12-12 DIAGNOSIS — E03.9 ACQUIRED HYPOTHYROIDISM: Primary | ICD-10-CM

## 2024-12-12 DIAGNOSIS — F32.A DEPRESSIVE DISORDER: ICD-10-CM

## 2024-12-12 DIAGNOSIS — M79.601 RIGHT ARM PAIN: ICD-10-CM

## 2024-12-12 DIAGNOSIS — I48.0 PAROXYSMAL ATRIAL FIBRILLATION (HCC): ICD-10-CM

## 2024-12-12 DIAGNOSIS — N18.31 TYPE 2 DIABETES MELLITUS WITH STAGE 3A CHRONIC KIDNEY DISEASE, WITHOUT LONG-TERM CURRENT USE OF INSULIN (HCC): ICD-10-CM

## 2024-12-12 DIAGNOSIS — G47.33 OBSTRUCTIVE SLEEP APNEA SYNDROME: ICD-10-CM

## 2024-12-12 DIAGNOSIS — E78.00 PURE HYPERCHOLESTEROLEMIA: ICD-10-CM

## 2024-12-12 PROCEDURE — 72040 X-RAY EXAM NECK SPINE 2-3 VW: CPT

## 2024-12-12 PROCEDURE — 73030 X-RAY EXAM OF SHOULDER: CPT

## 2024-12-12 NOTE — PROGRESS NOTES
[facility]  [address]  [place] [address]  Dept: [phone]     SUBJECTIVE     Ayla Marie is a 69 y.o.female        History of Present Illness  The patient presents for evaluation of diabetes and shoulder pain.    She has been experiencing difficulties with her Mounjaro treatment, including several instances of running out of the medication over the past 3 months. She also reports a prolonged period without a sensor, which was recently resolved. Despite these challenges, she has been closely monitoring her blood glucose levels, which have reached as high as 196. She recalls an incident where her blood glucose level was 160 after a meal, but she was able to enjoy dessert without any adverse effects. She had a sinus infection and pneumonia, for which she was prescribed prednisone. This medication caused her blood glucose levels to exceed 300, leading her to discontinue its use. She is under the care of Dr. Dove, whom she sees every 6 months, and reports no recent changes in her treatment plan.    She continues to experience pain in her shoulder, arm, and chest, which she describes as a sensation of her arm being partially numb. This pain has been present for approximately 5 weeks, coinciding with the onset of her sinus infection in the third week of October 2023. The pain is particularly severe above her right shoulder blade and towards the center, and it intensifies when she is tired or lies down. It takes her over an hour to find a comfortable position to sleep. She has an upcoming appointment with a chiropractor and is seeking advice on whether to proceed with this visit.    MEDICATIONS  Current: Mounjaro  Discontinued: Prednisone    Patient Active Problem List   Diagnosis    Atrial fibrillation (HCC)    Chronic venous insufficiency    Depressive disorder    Hyperlipidemia    Obstructive sleep apnea syndrome    Body mass index (BMI) 50.0-59.9, adult

## 2024-12-29 ENCOUNTER — PATIENT MESSAGE (OUTPATIENT)
Dept: FAMILY MEDICINE CLINIC | Age: 69
End: 2024-12-29

## 2024-12-30 RX ORDER — DOXYCYCLINE HYCLATE 100 MG
100 TABLET ORAL 2 TIMES DAILY
Qty: 14 TABLET | Refills: 0 | Status: SHIPPED | OUTPATIENT
Start: 2024-12-30 | End: 2025-01-06

## 2024-12-31 RX ORDER — LISINOPRIL AND HYDROCHLOROTHIAZIDE 10; 12.5 MG/1; MG/1
TABLET ORAL
Qty: 180 TABLET | Refills: 3 | Status: SHIPPED | OUTPATIENT
Start: 2024-12-31

## 2025-01-03 ENCOUNTER — PROCEDURE VISIT (OUTPATIENT)
Dept: NEUROLOGY | Age: 70
End: 2025-01-03
Payer: MEDICARE

## 2025-01-03 DIAGNOSIS — R20.0 RIGHT ARM NUMBNESS: ICD-10-CM

## 2025-01-03 DIAGNOSIS — G56.01 RIGHT CARPAL TUNNEL SYNDROME: ICD-10-CM

## 2025-01-03 DIAGNOSIS — G56.21 ULNAR NEUROPATHY OF RIGHT UPPER EXTREMITY: Primary | ICD-10-CM

## 2025-01-03 DIAGNOSIS — M54.12 CERVICAL RADICULOPATHY: ICD-10-CM

## 2025-01-03 PROCEDURE — 95886 MUSC TEST DONE W/N TEST COMP: CPT | Performed by: PSYCHIATRY & NEUROLOGY

## 2025-01-03 PROCEDURE — 95911 NRV CNDJ TEST 9-10 STUDIES: CPT | Performed by: PSYCHIATRY & NEUROLOGY

## 2025-01-06 RX ORDER — METOPROLOL TARTRATE 100 MG/1
TABLET ORAL
Qty: 270 TABLET | Refills: 3 | Status: SHIPPED | OUTPATIENT
Start: 2025-01-06

## 2025-01-06 RX ORDER — LEVOTHYROXINE SODIUM 100 UG/1
100 TABLET ORAL DAILY
Qty: 90 TABLET | Refills: 3 | Status: SHIPPED | OUTPATIENT
Start: 2025-01-06

## 2025-01-22 DIAGNOSIS — F32.A DEPRESSIVE DISORDER: ICD-10-CM

## 2025-01-22 RX ORDER — ESCITALOPRAM OXALATE 20 MG/1
20 TABLET ORAL DAILY
Qty: 90 TABLET | Refills: 3 | Status: SHIPPED | OUTPATIENT
Start: 2025-01-22

## 2025-02-10 RX ORDER — LEVOTHYROXINE SODIUM 100 UG/1
100 TABLET ORAL DAILY
Qty: 90 TABLET | Refills: 1 | Status: SHIPPED | OUTPATIENT
Start: 2025-02-10

## 2025-02-10 RX ORDER — METOPROLOL TARTRATE 100 MG/1
TABLET ORAL
Qty: 270 TABLET | Refills: 1 | Status: SHIPPED | OUTPATIENT
Start: 2025-02-10

## 2025-02-13 ENCOUNTER — PATIENT MESSAGE (OUTPATIENT)
Dept: FAMILY MEDICINE CLINIC | Age: 70
End: 2025-02-13

## 2025-02-13 DIAGNOSIS — E83.52 HYPERCALCEMIA: Primary | ICD-10-CM

## 2025-02-13 DIAGNOSIS — N28.9 RENAL INSUFFICIENCY: ICD-10-CM

## 2025-02-13 NOTE — TELEPHONE ENCOUNTER
We did not have clearance form in office. Left message with Dr Alanis's office to see if clearance is needed.

## 2025-02-18 ENCOUNTER — TELEPHONE (OUTPATIENT)
Dept: FAMILY MEDICINE CLINIC | Age: 70
End: 2025-02-18

## 2025-02-18 ENCOUNTER — LAB (OUTPATIENT)
Dept: LAB | Age: 70
End: 2025-02-18

## 2025-02-18 LAB
BASOPHILS ABSOLUTE: 0.1 THOU/MM3 (ref 0–0.1)
BASOPHILS NFR BLD AUTO: 0.9 %
DEPRECATED RDW RBC AUTO: 45.2 FL (ref 35–45)
EOSINOPHIL NFR BLD AUTO: 3.2 %
EOSINOPHILS ABSOLUTE: 0.3 THOU/MM3 (ref 0–0.4)
ERYTHROCYTE [DISTWIDTH] IN BLOOD BY AUTOMATED COUNT: 13.3 % (ref 11.5–14.5)
HCT VFR BLD AUTO: 43 % (ref 37–47)
HGB BLD-MCNC: 14.1 GM/DL (ref 12–16)
IMM GRANULOCYTES # BLD AUTO: 0.1 THOU/MM3 (ref 0–0.07)
IMM GRANULOCYTES NFR BLD AUTO: 1.1 %
LYMPHOCYTES ABSOLUTE: 2.7 THOU/MM3 (ref 1–4.8)
LYMPHOCYTES NFR BLD AUTO: 29.4 %
MCH RBC QN AUTO: 29.9 PG (ref 26–33)
MCHC RBC AUTO-ENTMCNC: 32.8 GM/DL (ref 32.2–35.5)
MCV RBC AUTO: 91.3 FL (ref 81–99)
MONOCYTES ABSOLUTE: 0.6 THOU/MM3 (ref 0.4–1.3)
MONOCYTES NFR BLD AUTO: 6.8 %
NEUTROPHILS ABSOLUTE: 5.4 THOU/MM3 (ref 1.8–7.7)
NEUTROPHILS NFR BLD AUTO: 58.6 %
NRBC BLD AUTO-RTO: 0 /100 WBC
PLATELET # BLD AUTO: 296 THOU/MM3 (ref 130–400)
PMV BLD AUTO: 10.4 FL (ref 9.4–12.4)
RBC # BLD AUTO: 4.71 MILL/MM3 (ref 4.2–5.4)
WBC # BLD AUTO: 9.2 THOU/MM3 (ref 4.8–10.8)

## 2025-02-18 NOTE — TELEPHONE ENCOUNTER
Blank forms are scanned into patient chart. Will route to Dr Hernandez, will need competed in office Thursday when he is here.

## 2025-02-18 NOTE — TELEPHONE ENCOUNTER
We received a Pre-op clearance form to be filled out from [facility]. Form was scanned and placed in Dr Hernandez's folder to be completed.

## 2025-02-18 NOTE — TELEPHONE ENCOUNTER
Spoke with Dr Carlos office today they called this office , they stated the forms were faxed today again for the 3 time this month as they were requested they advised last time was on 2/14  .  I did confirm the this office fax while on the phone  today with them

## 2025-02-19 LAB
ANION GAP SERPL CALC-SCNC: 13 MEQ/L (ref 8–16)
BUN SERPL-MCNC: 27 MG/DL (ref 7–22)
CALCIUM SERPL-MCNC: 11.4 MG/DL (ref 8.2–9.6)
CHLORIDE SERPL-SCNC: 94 MEQ/L (ref 98–111)
CO2 SERPL-SCNC: 25 MEQ/L (ref 23–33)
CREAT SERPL-MCNC: 1 MG/DL (ref 0.4–1.2)
GFR SERPL CREATININE-BSD FRML MDRD: 60 ML/MIN/1.73M2
GLUCOSE SERPL-MCNC: 156 MG/DL (ref 70–108)
MRSA DNA SPEC QL NAA+PROBE: POSITIVE
POTASSIUM SERPL-SCNC: 4.7 MEQ/L (ref 3.5–5.2)
SODIUM SERPL-SCNC: 132 MEQ/L (ref 135–145)

## 2025-02-19 NOTE — TELEPHONE ENCOUNTER
Left detailed message with [facility] regarding patient clearance and to call our office as soon as possible.

## 2025-02-19 NOTE — TELEPHONE ENCOUNTER
We just need the note then from cardiology that luz cleared her.  Put on my desk with [facility] clearance form and I'll complete thursday

## 2025-02-19 NOTE — TELEPHONE ENCOUNTER
Notes scanned into chart and placed in box for review. Podiatry also sent over an order for shoe inserts for signature. Also placed in box for review and signature.

## 2025-02-19 NOTE — TELEPHONE ENCOUNTER
I've not seen anything about preop clearance yet.  Last A1c 8.0.  Is the surgeon comfortable with an A1C that high and increased infection risk?  Looks like saw cardiology for clearance.

## 2025-02-19 NOTE — TELEPHONE ENCOUNTER
Called Dr Gill's office and they will be faxing over pre op clearance note. She was seen on 2/11/2025.

## 2025-02-20 DIAGNOSIS — E83.52 HYPERCALCEMIA: Primary | ICD-10-CM

## 2025-02-21 ENCOUNTER — HOSPITAL ENCOUNTER (OUTPATIENT)
Age: 70
Discharge: HOME | End: 2025-02-21
Payer: MEDICARE

## 2025-02-21 DIAGNOSIS — E83.52 HYPERCALCEMIA: ICD-10-CM

## 2025-02-21 LAB
ANION GAP SERPL CALC-SCNC: 14 MEQ/L (ref 8–16)
BUN SERPL-MCNC: 22 MG/DL (ref 7–22)
CALCIUM IONIZED: 1.36 MMOL/L (ref 1.12–1.32)
CALCIUM SERPL-MCNC: 11.1 MG/DL (ref 8.2–9.6)
CHLORIDE SERPL-SCNC: 92 MEQ/L (ref 98–111)
CO2 SERPL-SCNC: 26 MEQ/L (ref 23–33)
CREAT SERPL-MCNC: 1.2 MG/DL (ref 0.4–1.2)
GFR SERPL CREATININE-BSD FRML MDRD: 48 ML/MIN/1.73M2
GLUCOSE SERPL-MCNC: 176 MG/DL (ref 70–108)
POTASSIUM SERPL-SCNC: 4.7 MEQ/L (ref 3.5–5.2)
SODIUM SERPL-SCNC: 132 MEQ/L (ref 135–145)

## 2025-02-21 PROCEDURE — 83970 ASSAY OF PARATHORMONE: CPT

## 2025-02-21 PROCEDURE — 36415 COLL VENOUS BLD VENIPUNCTURE: CPT

## 2025-02-21 PROCEDURE — 82330 ASSAY OF CALCIUM: CPT

## 2025-02-21 PROCEDURE — 80048 BASIC METABOLIC PNL TOTAL CA: CPT

## 2025-02-22 LAB — PTH-INTACT SERPL-MCNC: 33.6 PG/ML (ref 15–65)

## 2025-03-26 ENCOUNTER — LAB (OUTPATIENT)
Dept: LAB | Age: 70
End: 2025-03-26

## 2025-03-26 DIAGNOSIS — N18.31 TYPE 2 DIABETES MELLITUS WITH STAGE 3A CHRONIC KIDNEY DISEASE, WITHOUT LONG-TERM CURRENT USE OF INSULIN (HCC): ICD-10-CM

## 2025-03-26 DIAGNOSIS — E78.00 PURE HYPERCHOLESTEROLEMIA: ICD-10-CM

## 2025-03-26 DIAGNOSIS — E11.22 TYPE 2 DIABETES MELLITUS WITH STAGE 3A CHRONIC KIDNEY DISEASE, WITHOUT LONG-TERM CURRENT USE OF INSULIN (HCC): ICD-10-CM

## 2025-03-26 LAB
ALBUMIN SERPL BCG-MCNC: 4.2 G/DL (ref 3.4–4.9)
ALP SERPL-CCNC: 75 U/L (ref 35–104)
ALT SERPL W/O P-5'-P-CCNC: 12 U/L (ref 10–35)
ANION GAP SERPL CALC-SCNC: 12 MEQ/L (ref 8–16)
AST SERPL-CCNC: 14 U/L (ref 10–35)
BILIRUB SERPL-MCNC: 0.4 MG/DL (ref 0.3–1.2)
BUN SERPL-MCNC: 30 MG/DL (ref 8–23)
CALCIUM SERPL-MCNC: 10.8 MG/DL (ref 8.8–10.2)
CHLORIDE SERPL-SCNC: 95 MEQ/L (ref 98–111)
CO2 SERPL-SCNC: 24 MEQ/L (ref 22–29)
CREAT SERPL-MCNC: 1.2 MG/DL (ref 0.5–0.9)
DEPRECATED MEAN GLUCOSE BLD GHB EST-ACNC: 171 MG/DL (ref 70–126)
GFR SERPL CREATININE-BSD FRML MDRD: 48 ML/MIN/1.73M2
GLUCOSE SERPL-MCNC: 187 MG/DL (ref 74–109)
HBA1C MFR BLD HPLC: 7.7 % (ref 4–6)
POTASSIUM SERPL-SCNC: 4.4 MEQ/L (ref 3.5–5.2)
PROT SERPL-MCNC: 7.1 G/DL (ref 6.4–8.3)
SODIUM SERPL-SCNC: 131 MEQ/L (ref 135–145)

## 2025-03-27 ENCOUNTER — RESULTS FOLLOW-UP (OUTPATIENT)
Dept: FAMILY MEDICINE CLINIC | Age: 70
End: 2025-03-27

## 2025-03-31 SDOH — ECONOMIC STABILITY: TRANSPORTATION INSECURITY
IN THE PAST 12 MONTHS, HAS THE LACK OF TRANSPORTATION KEPT YOU FROM MEDICAL APPOINTMENTS OR FROM GETTING MEDICATIONS?: NO

## 2025-03-31 SDOH — ECONOMIC STABILITY: INCOME INSECURITY: IN THE LAST 12 MONTHS, WAS THERE A TIME WHEN YOU WERE NOT ABLE TO PAY THE MORTGAGE OR RENT ON TIME?: NO

## 2025-03-31 SDOH — ECONOMIC STABILITY: FOOD INSECURITY: WITHIN THE PAST 12 MONTHS, THE FOOD YOU BOUGHT JUST DIDN'T LAST AND YOU DIDN'T HAVE MONEY TO GET MORE.: NEVER TRUE

## 2025-03-31 SDOH — ECONOMIC STABILITY: FOOD INSECURITY: WITHIN THE PAST 12 MONTHS, YOU WORRIED THAT YOUR FOOD WOULD RUN OUT BEFORE YOU GOT MONEY TO BUY MORE.: NEVER TRUE

## 2025-03-31 ASSESSMENT — PATIENT HEALTH QUESTIONNAIRE - PHQ9
SUM OF ALL RESPONSES TO PHQ QUESTIONS 1-9: 2
SUM OF ALL RESPONSES TO PHQ QUESTIONS 1-9: 2
8. MOVING OR SPEAKING SO SLOWLY THAT OTHER PEOPLE COULD HAVE NOTICED. OR THE OPPOSITE, BEING SO FIGETY OR RESTLESS THAT YOU HAVE BEEN MOVING AROUND A LOT MORE THAN USUAL: NOT AT ALL
1. LITTLE INTEREST OR PLEASURE IN DOING THINGS: NOT AT ALL
2. FEELING DOWN, DEPRESSED OR HOPELESS: SEVERAL DAYS
4. FEELING TIRED OR HAVING LITTLE ENERGY: NOT AT ALL
3. TROUBLE FALLING OR STAYING ASLEEP: SEVERAL DAYS
4. FEELING TIRED OR HAVING LITTLE ENERGY: NOT AT ALL
10. IF YOU CHECKED OFF ANY PROBLEMS, HOW DIFFICULT HAVE THESE PROBLEMS MADE IT FOR YOU TO DO YOUR WORK, TAKE CARE OF THINGS AT HOME, OR GET ALONG WITH OTHER PEOPLE: NOT DIFFICULT AT ALL
SUM OF ALL RESPONSES TO PHQ QUESTIONS 1-9: 2
8. MOVING OR SPEAKING SO SLOWLY THAT OTHER PEOPLE COULD HAVE NOTICED. OR THE OPPOSITE - BEING SO FIDGETY OR RESTLESS THAT YOU HAVE BEEN MOVING AROUND A LOT MORE THAN USUAL: NOT AT ALL
SUM OF ALL RESPONSES TO PHQ QUESTIONS 1-9: 2
9. THOUGHTS THAT YOU WOULD BE BETTER OFF DEAD, OR OF HURTING YOURSELF: NOT AT ALL
5. POOR APPETITE OR OVEREATING: NOT AT ALL
6. FEELING BAD ABOUT YOURSELF - OR THAT YOU ARE A FAILURE OR HAVE LET YOURSELF OR YOUR FAMILY DOWN: NOT AT ALL
7. TROUBLE CONCENTRATING ON THINGS, SUCH AS READING THE NEWSPAPER OR WATCHING TELEVISION: NOT AT ALL
10. IF YOU CHECKED OFF ANY PROBLEMS, HOW DIFFICULT HAVE THESE PROBLEMS MADE IT FOR YOU TO DO YOUR WORK, TAKE CARE OF THINGS AT HOME, OR GET ALONG WITH OTHER PEOPLE: NOT DIFFICULT AT ALL
2. FEELING DOWN, DEPRESSED OR HOPELESS: SEVERAL DAYS
9. THOUGHTS THAT YOU WOULD BE BETTER OFF DEAD, OR OF HURTING YOURSELF: NOT AT ALL
6. FEELING BAD ABOUT YOURSELF - OR THAT YOU ARE A FAILURE OR HAVE LET YOURSELF OR YOUR FAMILY DOWN: NOT AT ALL
5. POOR APPETITE OR OVEREATING: NOT AT ALL
1. LITTLE INTEREST OR PLEASURE IN DOING THINGS: NOT AT ALL
7. TROUBLE CONCENTRATING ON THINGS, SUCH AS READING THE NEWSPAPER OR WATCHING TELEVISION: NOT AT ALL
3. TROUBLE FALLING OR STAYING ASLEEP: SEVERAL DAYS
SUM OF ALL RESPONSES TO PHQ QUESTIONS 1-9: 2

## 2025-04-03 ENCOUNTER — OFFICE VISIT (OUTPATIENT)
Dept: FAMILY MEDICINE CLINIC | Age: 70
End: 2025-04-03
Payer: MEDICARE

## 2025-04-03 VITALS
OXYGEN SATURATION: 94 % | SYSTOLIC BLOOD PRESSURE: 128 MMHG | HEIGHT: 65 IN | BODY MASS INDEX: 43.32 KG/M2 | HEART RATE: 72 BPM | WEIGHT: 260 LBS | DIASTOLIC BLOOD PRESSURE: 74 MMHG

## 2025-04-03 DIAGNOSIS — E03.9 ACQUIRED HYPOTHYROIDISM: ICD-10-CM

## 2025-04-03 DIAGNOSIS — E11.22 TYPE 2 DIABETES MELLITUS WITH STAGE 3A CHRONIC KIDNEY DISEASE, WITHOUT LONG-TERM CURRENT USE OF INSULIN (HCC): Primary | ICD-10-CM

## 2025-04-03 DIAGNOSIS — I87.2 CHRONIC VENOUS INSUFFICIENCY: ICD-10-CM

## 2025-04-03 DIAGNOSIS — N18.31 TYPE 2 DIABETES MELLITUS WITH STAGE 3A CHRONIC KIDNEY DISEASE, WITHOUT LONG-TERM CURRENT USE OF INSULIN (HCC): Primary | ICD-10-CM

## 2025-04-03 DIAGNOSIS — F32.A DEPRESSIVE DISORDER: ICD-10-CM

## 2025-04-03 DIAGNOSIS — E78.00 PURE HYPERCHOLESTEROLEMIA: ICD-10-CM

## 2025-04-03 DIAGNOSIS — I48.0 PAROXYSMAL ATRIAL FIBRILLATION (HCC): ICD-10-CM

## 2025-04-03 DIAGNOSIS — G47.33 OBSTRUCTIVE SLEEP APNEA SYNDROME: ICD-10-CM

## 2025-04-03 PROCEDURE — 1159F MED LIST DOCD IN RCRD: CPT | Performed by: FAMILY MEDICINE

## 2025-04-03 PROCEDURE — G8417 CALC BMI ABV UP PARAM F/U: HCPCS | Performed by: FAMILY MEDICINE

## 2025-04-03 PROCEDURE — 3017F COLORECTAL CA SCREEN DOC REV: CPT | Performed by: FAMILY MEDICINE

## 2025-04-03 PROCEDURE — G8400 PT W/DXA NO RESULTS DOC: HCPCS | Performed by: FAMILY MEDICINE

## 2025-04-03 PROCEDURE — 2022F DILAT RTA XM EVC RTNOPTHY: CPT | Performed by: FAMILY MEDICINE

## 2025-04-03 PROCEDURE — 3051F HG A1C>EQUAL 7.0%<8.0%: CPT | Performed by: FAMILY MEDICINE

## 2025-04-03 PROCEDURE — G8427 DOCREV CUR MEDS BY ELIG CLIN: HCPCS | Performed by: FAMILY MEDICINE

## 2025-04-03 PROCEDURE — 1090F PRES/ABSN URINE INCON ASSESS: CPT | Performed by: FAMILY MEDICINE

## 2025-04-03 PROCEDURE — 1123F ACP DISCUSS/DSCN MKR DOCD: CPT | Performed by: FAMILY MEDICINE

## 2025-04-03 PROCEDURE — 99214 OFFICE O/P EST MOD 30 MIN: CPT | Performed by: FAMILY MEDICINE

## 2025-04-03 PROCEDURE — 1036F TOBACCO NON-USER: CPT | Performed by: FAMILY MEDICINE

## 2025-04-03 NOTE — PROGRESS NOTES
[facility]  [address]  [place] [address]  Dept: [phone]     SUBJECTIVE     Ayla Marie is a 69 y.o.female        History of Present Illness  The patient presents for evaluation of diabetes mellitus, post-surgical complications, and calcification.    A persistent cough has been experienced for the past 3 weeks. Weight loss of approximately 20 pounds is reported, attributed to recent surgical procedures. Two surgeries were undergone within a span of 2 weeks due to residual scar tissue from the initial procedure. An episode where blood glucose level spiked to 372 despite minimal food intake is recalled, prompting water consumption until levels normalized. Fluctuations in blood glucose levels have been experienced for nearly a month. The last consultation with Dr. De La Torre was prior to surgery, during which no changes were made to the treatment plan. An upcoming appointment with a nephrologist is scheduled for 04/2025. Discontinuation of calcium supplements has been advised. New shoes have been recommended by Dr. De La Torre, but there has been difficulty in obtaining them as they are not in yet. Current shoes are getting crooked and are not going to be good for her feet soon.    A desire to regain full functionality of the hand is expressed, which is currently limited in its ability to straighten, pull together, or cross. Referral to an occupational therapist led to the identification of additional scar tissue and an estimated recovery period of up to a year. Difficulty in writing due to inadequate pressure application while holding a pen is reported. Strength assessment revealed a 1-pound pressure on one side and 13 pounds on the other, with a 3-pound pressure using 2 fingers on one side and 15 pounds on the other. Significant pain and numbness in one finger are reported. A brace has been recommended for the finger to prevent potential fractures.    PAST 
  Assessment & Plan      Attestation        Preventive Health Topics:  Pt declines HIV and HEP C screening at this time due to lack of risk factors.    Encouraged COVID VACCINE  Encouraged FLU VACCINE after October 1st annually.   Encouraged TETANUS SHOT (TdaP/ ADACEL/ BOOSTRIX) per personal pharmacy.  Encouraged PREVNAR 20 at this time  Encouraged SHINGLES SHOT (SHINGRIX) - check with insurance re coverage and location of administration.        Allen Hernandez MD  11:41 AM  4/3/25

## 2025-04-09 ENCOUNTER — OFFICE VISIT (OUTPATIENT)
Dept: NEPHROLOGY | Age: 70
End: 2025-04-09
Payer: MEDICARE

## 2025-04-09 VITALS
DIASTOLIC BLOOD PRESSURE: 64 MMHG | OXYGEN SATURATION: 94 % | SYSTOLIC BLOOD PRESSURE: 110 MMHG | BODY MASS INDEX: 43.55 KG/M2 | HEIGHT: 65 IN | WEIGHT: 261.4 LBS | HEART RATE: 77 BPM

## 2025-04-09 DIAGNOSIS — N18.31 STAGE 3A CHRONIC KIDNEY DISEASE (HCC): Primary | ICD-10-CM

## 2025-04-09 DIAGNOSIS — E11.21 DIABETIC NEPHROPATHY ASSOCIATED WITH TYPE 2 DIABETES MELLITUS: ICD-10-CM

## 2025-04-09 DIAGNOSIS — E87.1 HYPONATREMIA: ICD-10-CM

## 2025-04-09 DIAGNOSIS — E83.52 HYPERCALCEMIA: ICD-10-CM

## 2025-04-09 DIAGNOSIS — I10 PRIMARY HYPERTENSION: ICD-10-CM

## 2025-04-09 PROCEDURE — 1123F ACP DISCUSS/DSCN MKR DOCD: CPT | Performed by: INTERNAL MEDICINE

## 2025-04-09 PROCEDURE — 2022F DILAT RTA XM EVC RTNOPTHY: CPT | Performed by: INTERNAL MEDICINE

## 2025-04-09 PROCEDURE — 3078F DIAST BP <80 MM HG: CPT | Performed by: INTERNAL MEDICINE

## 2025-04-09 PROCEDURE — 3074F SYST BP LT 130 MM HG: CPT | Performed by: INTERNAL MEDICINE

## 2025-04-09 PROCEDURE — 1159F MED LIST DOCD IN RCRD: CPT | Performed by: INTERNAL MEDICINE

## 2025-04-09 PROCEDURE — G8417 CALC BMI ABV UP PARAM F/U: HCPCS | Performed by: INTERNAL MEDICINE

## 2025-04-09 PROCEDURE — 3051F HG A1C>EQUAL 7.0%<8.0%: CPT | Performed by: INTERNAL MEDICINE

## 2025-04-09 PROCEDURE — G8400 PT W/DXA NO RESULTS DOC: HCPCS | Performed by: INTERNAL MEDICINE

## 2025-04-09 PROCEDURE — G8427 DOCREV CUR MEDS BY ELIG CLIN: HCPCS | Performed by: INTERNAL MEDICINE

## 2025-04-09 PROCEDURE — 1036F TOBACCO NON-USER: CPT | Performed by: INTERNAL MEDICINE

## 2025-04-09 PROCEDURE — 99204 OFFICE O/P NEW MOD 45 MIN: CPT | Performed by: INTERNAL MEDICINE

## 2025-04-09 PROCEDURE — 1090F PRES/ABSN URINE INCON ASSESS: CPT | Performed by: INTERNAL MEDICINE

## 2025-04-09 PROCEDURE — 3017F COLORECTAL CA SCREEN DOC REV: CPT | Performed by: INTERNAL MEDICINE

## 2025-04-09 NOTE — PROGRESS NOTES
Nephrology Consult Note  Patient's Name: Ayla Marie  12:09 PM  4/9/2025    Nephrologist: Delmy    Reason for Consult:  Elevated serum creatinine level  Requesting Physician:  No att. providers found  PCP: Allen Hernandez MD    Chief Complaint:  None  Assessment    ICD-10-CM    1. Stage 3a chronic kidney disease (HCC)  N18.31       2. Diabetic nephropathy associated with type 2 diabetes mellitus  E11.21       3. Primary hypertension  I10       4. Hyponatremia  E87.1       5. Hypercalcemia  E83.52             Plan    Chronic kidney disease is most likely due to combination of diabetic nephropathy and hypertensive nephrosclerosis.  This may also be exacerbated by distal diuretic hydrochlorothiazide.  However, possibility of other entities cannot be completely discarded.  I discussed with patient and family.  Will get a baseline kidney ultrasound.  Will check markers of chronicity.  Kappa with lambda free light chain assay ratio.  Avoid any nonsteroidal anti-inflammatory drugs and other nephrotoxic agents.  Comprehensive list provided today.  Will also check a urine protein creatinine ratio.  Diabetes mellitus is managed by another provider.  Primary hypertension is stable.  Hyponatremia is mild and likely due to distal diuretic hydrochlorothiazide.  Medication will check a TSH level.  Hypercalcemia may also be due to hydrochlorothiazide.  she is on vitamin C which sometimes can cause hypercalcemia as well.  Volume contraction can present similarly.  This was discussed with the patient and family.  Advised to discontinue vitamin C for now.  Will also check kappa with lambda free light chain assay ratio as discussed above to rule out Myelodysplastic disease as a cause of the hypercalcemia.  I doubt it.  Will however continue hydrochlorothiazide for now.  It may be discontinued in the future depending on serum creatinine level as well as serum calcium level.  Medications reviewed  No other changes  Return visit

## 2025-04-09 NOTE — PROGRESS NOTES
New patient referred by Dr. Hernandez for hypercalcemia and renal insufficiency.  Reports frequent urination. She reports water intake of 50-65 oz daily. She does report bilateral leg/feet swelling/fluid retention. It is difficult for her to walk. She does have Charcot right leg/foot. She does have compression socks but doesn't wear them regularly. She does have \"pumps\" to use PRN.   Denies having US of abdomen area.   They lost a son 14 months ago. (Blood clot/covid) She suffered from \"broken heart\"  She had pneumonia in November.

## 2025-04-10 ENCOUNTER — PATIENT MESSAGE (OUTPATIENT)
Dept: FAMILY MEDICINE CLINIC | Age: 70
End: 2025-04-10

## 2025-04-10 DIAGNOSIS — N18.31 TYPE 2 DIABETES MELLITUS WITH STAGE 3A CHRONIC KIDNEY DISEASE, WITH LONG-TERM CURRENT USE OF INSULIN (HCC): ICD-10-CM

## 2025-04-10 DIAGNOSIS — Z79.4 TYPE 2 DIABETES MELLITUS WITH STAGE 3A CHRONIC KIDNEY DISEASE, WITH LONG-TERM CURRENT USE OF INSULIN (HCC): ICD-10-CM

## 2025-04-10 DIAGNOSIS — E11.22 TYPE 2 DIABETES MELLITUS WITH STAGE 3A CHRONIC KIDNEY DISEASE, WITH LONG-TERM CURRENT USE OF INSULIN (HCC): ICD-10-CM

## 2025-04-10 DIAGNOSIS — F32.A DEPRESSIVE DISORDER: ICD-10-CM

## 2025-04-10 RX ORDER — TIRZEPATIDE 15 MG/.5ML
INJECTION, SOLUTION SUBCUTANEOUS
Qty: 2 ML | Refills: 11 | Status: SHIPPED | OUTPATIENT
Start: 2025-04-10 | End: 2025-04-11 | Stop reason: SDUPTHER

## 2025-04-10 RX ORDER — ESCITALOPRAM OXALATE 20 MG/1
20 TABLET ORAL DAILY
Qty: 90 TABLET | Refills: 3 | Status: SHIPPED | OUTPATIENT
Start: 2025-04-10 | End: 2025-04-11 | Stop reason: SDUPTHER

## 2025-04-10 NOTE — TELEPHONE ENCOUNTER
Patient's last appointment was : 4/3/2025  Patient's next appointment is : 7/17/2025  Last refilled:4/26/24

## 2025-04-11 RX ORDER — ESCITALOPRAM OXALATE 20 MG/1
20 TABLET ORAL DAILY
Qty: 90 TABLET | Refills: 3 | Status: SHIPPED | OUTPATIENT
Start: 2025-04-11

## 2025-04-11 RX ORDER — LISINOPRIL AND HYDROCHLOROTHIAZIDE 10; 12.5 MG/1; MG/1
TABLET ORAL
Qty: 180 TABLET | Refills: 3 | Status: SHIPPED | OUTPATIENT
Start: 2025-04-11

## 2025-04-11 RX ORDER — TIRZEPATIDE 15 MG/.5ML
15 INJECTION, SOLUTION SUBCUTANEOUS WEEKLY
Qty: 2 ML | Refills: 11 | Status: SHIPPED | OUTPATIENT
Start: 2025-04-11

## 2025-05-07 ENCOUNTER — HOSPITAL ENCOUNTER (OUTPATIENT)
Dept: ULTRASOUND IMAGING | Age: 70
Discharge: HOME | End: 2025-05-07
Attending: INTERNAL MEDICINE
Payer: MEDICARE

## 2025-05-07 ENCOUNTER — HOSPITAL ENCOUNTER (OUTPATIENT)
Age: 70
Discharge: HOME | End: 2025-05-07
Attending: INTERNAL MEDICINE
Payer: MEDICARE

## 2025-05-07 DIAGNOSIS — E83.52 HYPERCALCEMIA: ICD-10-CM

## 2025-05-07 DIAGNOSIS — N18.31 STAGE 3A CHRONIC KIDNEY DISEASE (HCC): ICD-10-CM

## 2025-05-07 LAB
25(OH)D3 SERPL-MCNC: 26 NG/ML (ref 30–100)
ANION GAP SERPL CALC-SCNC: 13 MEQ/L (ref 8–16)
BUN SERPL-MCNC: 25 MG/DL (ref 8–23)
CALCIUM SERPL-MCNC: 11.5 MG/DL (ref 8.8–10.2)
CHLORIDE SERPL-SCNC: 93 MEQ/L (ref 98–111)
CO2 SERPL-SCNC: 25 MEQ/L (ref 22–29)
CREAT SERPL-MCNC: 1.3 MG/DL (ref 0.5–0.9)
CREAT UR-MCNC: 109 MG/DL
FREE KAPPA/LAMBDA RATIO: 0.83 (ref 0.22–1.74)
GFR SERPL CREATININE-BSD FRML MDRD: 44 ML/MIN/1.73M2
GLUCOSE SERPL-MCNC: 183 MG/DL (ref 74–109)
KAPPA LC FREE SER-MCNC: 33.5 MG/L
LAMBDA LC FREE SERPL-MCNC: 40.3 MG/L (ref 4.2–27.7)
POTASSIUM SERPL-SCNC: 4.5 MEQ/L (ref 3.5–5.2)
PROT UR-MCNC: 10.7 MG/DL
PROT/CREAT 24H UR: 0.1 MG/G{CREAT}
PTH-INTACT SERPL-MCNC: 38 PG/ML (ref 15–65)
SODIUM SERPL-SCNC: 131 MEQ/L (ref 135–145)

## 2025-05-07 PROCEDURE — 36415 COLL VENOUS BLD VENIPUNCTURE: CPT

## 2025-05-07 PROCEDURE — 76770 US EXAM ABDO BACK WALL COMP: CPT

## 2025-05-07 PROCEDURE — 82570 ASSAY OF URINE CREATININE: CPT

## 2025-05-07 PROCEDURE — 82306 VITAMIN D 25 HYDROXY: CPT

## 2025-05-07 PROCEDURE — 84156 ASSAY OF PROTEIN URINE: CPT

## 2025-05-07 PROCEDURE — 83883 ASSAY NEPHELOMETRY NOT SPEC: CPT

## 2025-05-07 PROCEDURE — 83970 ASSAY OF PARATHORMONE: CPT

## 2025-05-07 PROCEDURE — 80048 BASIC METABOLIC PNL TOTAL CA: CPT

## 2025-05-12 ENCOUNTER — PATIENT MESSAGE (OUTPATIENT)
Dept: FAMILY MEDICINE CLINIC | Age: 70
End: 2025-05-12

## 2025-05-14 ENCOUNTER — OFFICE VISIT (OUTPATIENT)
Dept: NEPHROLOGY | Age: 70
End: 2025-05-14

## 2025-05-14 VITALS
OXYGEN SATURATION: 98 % | BODY MASS INDEX: 43.15 KG/M2 | WEIGHT: 259 LBS | SYSTOLIC BLOOD PRESSURE: 115 MMHG | HEIGHT: 65 IN | DIASTOLIC BLOOD PRESSURE: 62 MMHG | HEART RATE: 72 BPM

## 2025-05-14 DIAGNOSIS — E83.52 HYPERCALCEMIA: ICD-10-CM

## 2025-05-14 DIAGNOSIS — E87.1 HYPONATREMIA: ICD-10-CM

## 2025-05-14 DIAGNOSIS — E55.9 VITAMIN D DEFICIENCY: ICD-10-CM

## 2025-05-14 DIAGNOSIS — N28.1 RENAL CYST: ICD-10-CM

## 2025-05-14 DIAGNOSIS — N18.32 STAGE 3B CHRONIC KIDNEY DISEASE (HCC): Primary | ICD-10-CM

## 2025-05-14 RX ORDER — FLUCONAZOLE 150 MG/1
TABLET ORAL
COMMUNITY
Start: 2025-05-05

## 2025-05-14 RX ORDER — HYDROCODONE BITARTRATE AND ACETAMINOPHEN 5; 325 MG/1; MG/1
TABLET ORAL
COMMUNITY
Start: 2025-03-11 | End: 2025-05-14 | Stop reason: ALTCHOICE

## 2025-05-14 NOTE — PROGRESS NOTES
Renal Progress Note    Assessment and Plan:      Diagnosis Orders   1. Stage 3b chronic kidney disease (HCC)        2. Hyponatremia        3. Hypercalcemia        4. Vitamin D deficiency        5. Renal cyst                  PLAN:  Serum creatinine is stable at 1.3 mg/dL.  Hyponatremia is mild and due to the distal diuretic.  Hypercalcemia is due to oral calcium supplement and possible hydrochlorothiazide as a contributory factor.  I advised her to discontinue oral calcium supplement completely.  Vitamin D level is low at 26.  She is advised to take vitamin D3 over-the-counter 5000 international unit 1 tablet a day.  Renal cyst is benign and  no further evaluation.  Medications reviewed  No changes  Continue to avoid any nonsteroidal anti-inflammatory drugs.  Return in 6 months with labs          Patient Active Problem List   Diagnosis    Atrial fibrillation (HCC)    Chronic venous insufficiency    Depressive disorder    Hyperlipidemia    Obstructive sleep apnea syndrome    Body mass index (BMI) 50.0-59.9, adult (HCC)    Acquired hypothyroidism    Type 2 diabetes mellitus           Subjective:   Chief complaint:  Chief Complaint   Patient presents with    Follow-up     1 Month FU for CKD 3 a      HPI:This is a follow up visit for Ms. Ayla Marie here today for return appointment.I see her  for chronic kidney disease.  She was seen in the initial appointment about 4 weeks ago for elevated serum creatinine level.   No specific  concern today.  She has been treated with amoxicillin clavulanate for foot infection.  Otherwise doing well.  Denies any recent chest pain shortness of breath. .  No hospital admissions since I saw him.  No nausea or vomiting.    ROS:  Pertinent positives stated above in HPI. All other systems were reviewed and were negative.  Medications:     Current Outpatient Medications   Medication Sig Dispense Refill    amoxicillin-clavulanate (AUGMENTIN) 875-125 MG per tablet       escitalopram 
She currently is being treated for staph/strep infection on her foot. Today will be her last Doxycycline and Friday will be her last Augmentin. She says she still has a small area of concern that is not healing well.   
clear.  Oral mucosa is moist.  Neck:supple with no thyromegaly, JVD, lymphadenopathy or bruit **  Cardiovascular: Regular sinus rhythm without murmur, rubs or gallops ***  Respiratory:  Clear to auscultation with no wheezes or rales  Abdomen: Good bowel sound, soft, non tender and no bruit  Ext: *** LE edema  Musculoskeletal:Intact  Neuro:Alert, awake and oriented with no obvious focal deficit.  Speech is normal.**    Electronically signed by Gene Brock MD on 5/14/2025 at 11:02 AM   **This report has been created using voice recognition software. It maycontain minor  errors which are inherent in voice recognition technology.**

## 2025-06-09 ENCOUNTER — TELEPHONE (OUTPATIENT)
Dept: FAMILY MEDICINE CLINIC | Age: 70
End: 2025-06-09

## 2025-06-16 ENCOUNTER — TELEPHONE (OUTPATIENT)
Dept: FAMILY MEDICINE CLINIC | Age: 70
End: 2025-06-16

## 2025-06-16 NOTE — TELEPHONE ENCOUNTER
We received a fax from [employer] requesting a refill on [employer] for the pt. Please see attached blank form.    [employer] fax [phone]

## 2025-06-25 ENCOUNTER — TELEPHONE (OUTPATIENT)
Dept: FAMILY MEDICINE CLINIC | Age: 70
End: 2025-06-25

## 2025-06-25 NOTE — TELEPHONE ENCOUNTER
[employer] faxed a request for last 6 months of OV notes to [phone]. Faxed last two notes to company.

## 2025-07-07 ENCOUNTER — TELEPHONE (OUTPATIENT)
Dept: FAMILY MEDICINE CLINIC | Age: 70
End: 2025-07-07

## 2025-07-07 NOTE — TELEPHONE ENCOUNTER
Received medical supply order from [facility] and that needs to be filled out and faxed back along with 6 months of OV notes to [phone]

## 2025-07-14 ENCOUNTER — OFFICE VISIT (OUTPATIENT)
Dept: FAMILY MEDICINE CLINIC | Age: 70
End: 2025-07-14

## 2025-07-14 ENCOUNTER — LAB (OUTPATIENT)
Dept: LAB | Age: 70
End: 2025-07-14

## 2025-07-14 ENCOUNTER — TELEPHONE (OUTPATIENT)
Dept: FAMILY MEDICINE CLINIC | Age: 70
End: 2025-07-14

## 2025-07-14 VITALS
SYSTOLIC BLOOD PRESSURE: 126 MMHG | HEART RATE: 77 BPM | TEMPERATURE: 98.6 F | OXYGEN SATURATION: 98 % | WEIGHT: 261 LBS | DIASTOLIC BLOOD PRESSURE: 74 MMHG | BODY MASS INDEX: 43.49 KG/M2 | RESPIRATION RATE: 16 BRPM | HEIGHT: 65 IN

## 2025-07-14 DIAGNOSIS — L03.012 PARONYCHIA OF LEFT THUMB: Primary | ICD-10-CM

## 2025-07-14 DIAGNOSIS — N18.31 TYPE 2 DIABETES MELLITUS WITH STAGE 3A CHRONIC KIDNEY DISEASE, WITHOUT LONG-TERM CURRENT USE OF INSULIN (HCC): Primary | ICD-10-CM

## 2025-07-14 DIAGNOSIS — E78.00 PURE HYPERCHOLESTEROLEMIA: ICD-10-CM

## 2025-07-14 DIAGNOSIS — N18.31 TYPE 2 DIABETES MELLITUS WITH STAGE 3A CHRONIC KIDNEY DISEASE, WITHOUT LONG-TERM CURRENT USE OF INSULIN (HCC): ICD-10-CM

## 2025-07-14 DIAGNOSIS — E11.22 TYPE 2 DIABETES MELLITUS WITH STAGE 3A CHRONIC KIDNEY DISEASE, WITHOUT LONG-TERM CURRENT USE OF INSULIN (HCC): ICD-10-CM

## 2025-07-14 DIAGNOSIS — E11.22 TYPE 2 DIABETES MELLITUS WITH STAGE 3A CHRONIC KIDNEY DISEASE, WITHOUT LONG-TERM CURRENT USE OF INSULIN (HCC): Primary | ICD-10-CM

## 2025-07-14 LAB
ALBUMIN SERPL BCG-MCNC: 3.9 G/DL (ref 3.4–4.9)
ALP SERPL-CCNC: 80 U/L (ref 38–126)
ALT SERPL W/O P-5'-P-CCNC: 10 U/L (ref 10–35)
ANION GAP SERPL CALC-SCNC: 13 MEQ/L (ref 8–16)
AST SERPL-CCNC: 16 U/L (ref 10–35)
BILIRUB SERPL-MCNC: 0.5 MG/DL (ref 0.3–1.2)
BUN SERPL-MCNC: 29 MG/DL (ref 8–23)
CALCIUM SERPL-MCNC: 10.2 MG/DL (ref 8.8–10.2)
CHLORIDE SERPL-SCNC: 97 MEQ/L (ref 98–111)
CHOLEST SERPL-MCNC: 179 MG/DL (ref 100–199)
CO2 SERPL-SCNC: 24 MEQ/L (ref 22–29)
CREAT SERPL-MCNC: 1.3 MG/DL (ref 0.5–0.9)
DEPRECATED MEAN GLUCOSE BLD GHB EST-ACNC: 171 MG/DL (ref 70–126)
GFR SERPL CREATININE-BSD FRML MDRD: 44 ML/MIN/1.73M2
GLUCOSE SERPL-MCNC: 169 MG/DL (ref 74–109)
HBA1C MFR BLD HPLC: 7.7 % (ref 4–6)
HDLC SERPL-MCNC: 48 MG/DL
LDLC SERPL CALC-MCNC: 96 MG/DL
POTASSIUM SERPL-SCNC: 4.9 MEQ/L (ref 3.5–5.2)
PROT SERPL-MCNC: 6.9 G/DL (ref 6.4–8.3)
SODIUM SERPL-SCNC: 134 MEQ/L (ref 135–145)
TRIGL SERPL-MCNC: 173 MG/DL (ref 0–199)
TSH SERPL DL<=0.05 MIU/L-ACNC: 0.79 UIU/ML (ref 0.27–4.2)

## 2025-07-14 RX ORDER — CEPHALEXIN 500 MG/1
500 CAPSULE ORAL 2 TIMES DAILY
Qty: 20 CAPSULE | Refills: 0 | Status: SHIPPED | OUTPATIENT
Start: 2025-07-14 | End: 2025-07-24

## 2025-07-14 SDOH — HEALTH STABILITY: PHYSICAL HEALTH: ON AVERAGE, HOW MANY DAYS PER WEEK DO YOU ENGAGE IN MODERATE TO STRENUOUS EXERCISE (LIKE A BRISK WALK)?: 0 DAYS

## 2025-07-14 ASSESSMENT — PATIENT HEALTH QUESTIONNAIRE - PHQ9
SUM OF ALL RESPONSES TO PHQ QUESTIONS 1-9: 0
SUM OF ALL RESPONSES TO PHQ QUESTIONS 1-9: 0
7. TROUBLE CONCENTRATING ON THINGS, SUCH AS READING THE NEWSPAPER OR WATCHING TELEVISION: NOT AT ALL
3. TROUBLE FALLING OR STAYING ASLEEP: NOT AT ALL
2. FEELING DOWN, DEPRESSED OR HOPELESS: NOT AT ALL
5. POOR APPETITE OR OVEREATING: NOT AT ALL
SUM OF ALL RESPONSES TO PHQ QUESTIONS 1-9: 0
SUM OF ALL RESPONSES TO PHQ QUESTIONS 1-9: 0
6. FEELING BAD ABOUT YOURSELF - OR THAT YOU ARE A FAILURE OR HAVE LET YOURSELF OR YOUR FAMILY DOWN: NOT AT ALL
4. FEELING TIRED OR HAVING LITTLE ENERGY: NOT AT ALL
9. THOUGHTS THAT YOU WOULD BE BETTER OFF DEAD, OR OF HURTING YOURSELF: NOT AT ALL
8. MOVING OR SPEAKING SO SLOWLY THAT OTHER PEOPLE COULD HAVE NOTICED. OR THE OPPOSITE, BEING SO FIGETY OR RESTLESS THAT YOU HAVE BEEN MOVING AROUND A LOT MORE THAN USUAL: NOT AT ALL
10. IF YOU CHECKED OFF ANY PROBLEMS, HOW DIFFICULT HAVE THESE PROBLEMS MADE IT FOR YOU TO DO YOUR WORK, TAKE CARE OF THINGS AT HOME, OR GET ALONG WITH OTHER PEOPLE: NOT DIFFICULT AT ALL
1. LITTLE INTEREST OR PLEASURE IN DOING THINGS: NOT AT ALL

## 2025-07-14 ASSESSMENT — ENCOUNTER SYMPTOMS
NAUSEA: 0
COUGH: 0
ABDOMINAL DISTENTION: 0
SHORTNESS OF BREATH: 0
SINUS PRESSURE: 0
CHEST TIGHTNESS: 0
ABDOMINAL PAIN: 0
SORE THROAT: 0
WHEEZING: 0
COLOR CHANGE: 0
CONSTIPATION: 0
DIARRHEA: 0
VOMITING: 0
BACK PAIN: 0
STRIDOR: 0

## 2025-07-14 ASSESSMENT — LIFESTYLE VARIABLES
HOW OFTEN DO YOU HAVE A DRINK CONTAINING ALCOHOL: 2
HOW OFTEN DO YOU HAVE SIX OR MORE DRINKS ON ONE OCCASION: 1
HOW MANY STANDARD DRINKS CONTAINING ALCOHOL DO YOU HAVE ON A TYPICAL DAY: 1 OR 2
HOW OFTEN DO YOU HAVE A DRINK CONTAINING ALCOHOL: MONTHLY OR LESS
HOW MANY STANDARD DRINKS CONTAINING ALCOHOL DO YOU HAVE ON A TYPICAL DAY: 1

## 2025-07-14 NOTE — PROGRESS NOTES
Ayla Marie (:  1955) is a 70 y.o. female, Established patient, here for evaluation of the following chief complaint(s):  sore on finger (Thumb, along nail bed. Has been using antibiotic cream. )         Assessment & Plan  1. Paronychia.  - The condition is characterized by an infection around the cuticle, causing significant discomfort.  - Physical examination reveals redness and pus around the left thumb.  - Discussed the importance of soaking the affected area in lukewarm water with Epsom salt daily to help draw out the infection.  - A prescription for cephalexin 500 mg has been provided, to be taken twice daily. If the condition does not improve, further evaluation will be necessary.    Results    1. Paronychia of left thumb    Return if symptoms worsen or fail to improve.       Subjective   History of Present Illness  The patient presents for evaluation of paronychia.    She initially mistook the condition for a hangnail, which began on Tuesday. By Thursday, she noticed redness, and by Friday, pus was present. Despite applying antibiotic cream and using a Band-Aid, the condition did not improve. She also attempted to drain the pus herself. This is her first experience with paronychia. She had blood work done this morning for an upcoming appointment on Thursday and mentioned the paronychia to the technician, who then joleen additional blood for further testing.    She had cubital tunnel surgery on her left thumb twice because the first time it closed up with scar tissue, so they had to cut it a second time. It has been 4 months since the surgery, and she still has no feeling in one of her fingers. She cannot  with that hand, and her handwriting has deteriorated significantly.    PAST SURGICAL HISTORY:  Cubital tunnel surgery on the left thumb (twice)    Review of Systems   Constitutional:  Negative for activity change, appetite change, chills, diaphoresis, fatigue, fever and unexpected weight

## 2025-07-14 NOTE — TELEPHONE ENCOUNTER
Patient has appointment on Thursday and went [facility] to have her labs done. Pt states they did not see any orders in the computer. They did draw labs but will need orders placed in the computer. Can you order the labs that you are wanting done this time.

## 2025-07-14 NOTE — TELEPHONE ENCOUNTER
Got a call from [facility] as Beckie Coffey requested the pt reclast notes be sent over. She is sending them to us

## 2025-07-15 RX ORDER — METOPROLOL TARTRATE 100 MG/1
TABLET ORAL
Qty: 270 TABLET | Refills: 1 | Status: SHIPPED | OUTPATIENT
Start: 2025-07-15

## 2025-07-15 RX ORDER — LEVOTHYROXINE SODIUM 100 UG/1
100 TABLET ORAL DAILY
Qty: 90 TABLET | Refills: 1 | Status: SHIPPED | OUTPATIENT
Start: 2025-07-15

## 2025-07-15 NOTE — TELEPHONE ENCOUNTER
Last visit- 7/14/2025  Next visit- 7/17/2025    Requested Prescriptions     Pending Prescriptions Disp Refills    levothyroxine (SYNTHROID) 100 MCG tablet 90 tablet 1     Sig: Take 1 tablet by mouth daily    metoprolol (LOPRESSOR) 100 MG tablet 270 tablet 1     Sig: TAKE ONE (1) & (1/2) TABLETS, TWO TIMES A DAY, BY MOUTH.

## 2025-07-17 ENCOUNTER — OFFICE VISIT (OUTPATIENT)
Dept: FAMILY MEDICINE CLINIC | Age: 70
End: 2025-07-17

## 2025-07-17 VITALS
HEART RATE: 74 BPM | HEIGHT: 65 IN | DIASTOLIC BLOOD PRESSURE: 66 MMHG | SYSTOLIC BLOOD PRESSURE: 118 MMHG | BODY MASS INDEX: 43.02 KG/M2 | OXYGEN SATURATION: 96 % | WEIGHT: 258.2 LBS

## 2025-07-17 DIAGNOSIS — F32.A DEPRESSIVE DISORDER: ICD-10-CM

## 2025-07-17 DIAGNOSIS — Z00.00 MEDICARE ANNUAL WELLNESS VISIT, SUBSEQUENT: Primary | ICD-10-CM

## 2025-07-17 DIAGNOSIS — N18.31 TYPE 2 DIABETES MELLITUS WITH STAGE 3A CHRONIC KIDNEY DISEASE, WITHOUT LONG-TERM CURRENT USE OF INSULIN (HCC): ICD-10-CM

## 2025-07-17 DIAGNOSIS — I48.0 PAROXYSMAL ATRIAL FIBRILLATION (HCC): ICD-10-CM

## 2025-07-17 DIAGNOSIS — E03.9 ACQUIRED HYPOTHYROIDISM: ICD-10-CM

## 2025-07-17 DIAGNOSIS — E11.22 TYPE 2 DIABETES MELLITUS WITH STAGE 3A CHRONIC KIDNEY DISEASE, WITHOUT LONG-TERM CURRENT USE OF INSULIN (HCC): ICD-10-CM

## 2025-07-17 DIAGNOSIS — E78.00 PURE HYPERCHOLESTEROLEMIA: ICD-10-CM

## 2025-07-17 NOTE — PROGRESS NOTES
for additional education material             Inactivity:  On average, how many days per week do you engage in moderate to strenuous exercise (like a brisk walk)?: 0 days (!) Abnormal     Interventions:  See AVS for additional education material     Abnormal BMI (obese):  Body mass index is 42.97 kg/m². (!) Abnormal  Interventions:  See AVS for additional education material           Safety:  Do you always fasten your seatbelt when you are in a car?: (!) No  Interventions:  See AVS for additional education material                    Objective   Vitals:    07/17/25 1057   BP: 118/66   BP Site: Left Upper Arm   Patient Position: Sitting   BP Cuff Size: Large Adult   Pulse: 74   SpO2: 96%   Weight: 117.1 kg (258 lb 3.2 oz)   Height: 1.651 m (5' 5\")      Body mass index is 42.97 kg/m².        Physical Exam  Respiratory: Clear to auscultation, no wheezing, rales or rhonchi  Cardiovascular: Regular rate and rhythm, no murmurs, rubs, or gallops              Allergies   Allergen Reactions    Hydrocodone     Phenergan [Promethazine] Swelling     Pheneragan - used for Nausea/vomiting.  Throat swelling.     Prior to Visit Medications    Medication Sig Taking? Authorizing Provider   levothyroxine (SYNTHROID) 100 MCG tablet Take 1 tablet by mouth daily Yes Allen Hernandez MD   metoprolol (LOPRESSOR) 100 MG tablet TAKE ONE (1) & (1/2) TABLETS, TWO TIMES A DAY, BY MOUTH. Yes Allen Hernandez MD   Cholecalciferol (VITAMIN D-3 PO) Take by mouth Yes ProviderAdarsh MD   cephALEXin (KEFLEX) 500 MG capsule Take 1 capsule by mouth 2 times daily for 10 days Yes Keo Echeverria, APRN - CNP   escitalopram (LEXAPRO) 20 MG tablet Take 1 tablet by mouth daily Yes Allen Hernandez MD   lisinopril-hydroCHLOROthiazide (PRINZIDE;ZESTORETIC) 10-12.5 MG per tablet TAKE 1 TABLET TWICE A DAY Yes Allen Hernandez MD   Tirzepatide (MOUNJARO) 15 MG/0.5ML SOAJ Inject 15 mg into the muscle once a week Yes Allen Hernandez MD   INVOKANA 300 MG

## 2025-08-15 ENCOUNTER — HOSPITAL ENCOUNTER (OUTPATIENT)
Dept: MAMMOGRAPHY | Age: 70
Discharge: HOME | End: 2025-08-15
Payer: MEDICARE

## 2025-08-15 DIAGNOSIS — Z12.39 BREAST SCREENING: ICD-10-CM

## 2025-08-15 PROCEDURE — 77063 BREAST TOMOSYNTHESIS BI: CPT

## 2025-08-19 ENCOUNTER — HOSPITAL ENCOUNTER (OUTPATIENT)
Dept: WOMENS IMAGING | Age: 70
Discharge: HOME | End: 2025-08-19
Attending: RADIOLOGY
Payer: MEDICARE

## 2025-08-19 DIAGNOSIS — R92.8 ABNORMAL MAMMOGRAM: ICD-10-CM

## 2025-08-19 PROCEDURE — 76642 ULTRASOUND BREAST LIMITED: CPT

## 2025-08-19 PROCEDURE — G0279 TOMOSYNTHESIS, MAMMO: HCPCS

## 2025-08-20 DIAGNOSIS — R92.8 ABNORMAL MAMMOGRAM: Primary | ICD-10-CM

## 2025-08-20 DIAGNOSIS — Z09 FOLLOW-UP EXAM: ICD-10-CM
